# Patient Record
Sex: MALE | Race: WHITE | NOT HISPANIC OR LATINO | Employment: PART TIME | URBAN - METROPOLITAN AREA
[De-identification: names, ages, dates, MRNs, and addresses within clinical notes are randomized per-mention and may not be internally consistent; named-entity substitution may affect disease eponyms.]

---

## 2023-04-11 ENCOUNTER — TELEPHONE (OUTPATIENT)
Dept: PLASTIC SURGERY | Facility: CLINIC | Age: 58
End: 2023-04-11
Payer: COMMERCIAL

## 2023-04-11 NOTE — CONFIDENTIAL NOTE
[facility] :  Care Coordination Note     SITUATION   Yunior Holden (he/she) is a 57 year old adult who is receiving support for:  Care Team  .    BACKGROUND     Pt is scheduled for a minimal depth vaginoplasty consult with Dr. Valverde on 10/24/23.     Pt expressed wanting to meet sooner, so writer added her appointment to Dr. Valverde's cancellation waitlist.     ASSESSMENT     Surgery              CGC Assessment  [facility] [place] (P) Enrolled  Patient has a therapist: (P) Yes  Name of therapist: (P) Sondra France  Letter of support #1: (P) Requested  Letter of support #2: (P) Requested  Surgery being considered: (P) Yes  Vaginoplasty: (P) Yes    Pt reports:   No nicotine or other gender affirming surgeries  HRT 15 months      PLAN          Nursing Interventions:       Follow-up plan:  1. Obtain SEMAJ George

## 2023-04-11 NOTE — CONFIDENTIAL NOTE
Writer called re: follow up on pt voicemail requesting vaginoplasty consult with Dr. Valverde. Pt asked for a call back later in the day.     Writer called back two hours later and LVM.

## 2023-05-27 ENCOUNTER — HEALTH MAINTENANCE LETTER (OUTPATIENT)
Age: 58
End: 2023-05-27

## 2023-07-27 NOTE — TELEPHONE ENCOUNTER
FUTURE VISIT INFORMATION      FUTURE VISIT INFORMATION:  Date: 10/24/23  Time: 1:00pm  Location: [facility]  REFERRAL INFORMATION:  Reason for visit/diagnosis  minimal depth vaginoplasty     RECORDS REQUESTED FROM:       Clinic name Comments Records Status Imaging Status   [facility] Plastic surgery Ov/notes 7/27/23-8/16/19 Care Everywhere

## 2023-10-24 ENCOUNTER — PRE VISIT (OUTPATIENT)
Dept: UROLOGY | Facility: CLINIC | Age: 58
End: 2023-10-24

## 2023-10-24 ENCOUNTER — OFFICE VISIT (OUTPATIENT)
Dept: PLASTIC SURGERY | Facility: CLINIC | Age: 58
End: 2023-10-24
Payer: COMMERCIAL

## 2023-10-24 VITALS
OXYGEN SATURATION: 98 % | BODY MASS INDEX: 20.66 KG/M2 | DIASTOLIC BLOOD PRESSURE: 79 MMHG | SYSTOLIC BLOOD PRESSURE: 116 MMHG | WEIGHT: 155.9 LBS | HEART RATE: 71 BPM | HEIGHT: 73 IN

## 2023-10-24 DIAGNOSIS — F64.9 GENDER DYSPHORIA: Primary | ICD-10-CM

## 2023-10-24 PROBLEM — H04.123 DRY EYES: Status: ACTIVE | Noted: 2023-01-04

## 2023-10-24 PROBLEM — H11.001 PTERYGIUM OF RIGHT EYE: Status: ACTIVE | Noted: 2023-01-03

## 2023-10-24 PROBLEM — H15.101 EPISCLERITIS OF RIGHT EYE: Status: ACTIVE | Noted: 2023-01-04

## 2023-10-24 PROCEDURE — 99205 OFFICE O/P NEW HI 60 MIN: CPT | Performed by: UROLOGY

## 2023-10-24 RX ORDER — BUPROPION HYDROCHLORIDE 100 MG/1
200 TABLET, EXTENDED RELEASE ORAL DAILY
COMMUNITY

## 2023-10-24 RX ORDER — SPIRONOLACTONE 100 MG/1
100 TABLET, FILM COATED ORAL DAILY
COMMUNITY

## 2023-10-24 RX ORDER — ESTRADIOL 2 MG/1
TABLET ORAL
COMMUNITY

## 2023-10-24 RX ORDER — PROGESTERONE 100 MG/1
100 CAPSULE ORAL AT BEDTIME
COMMUNITY

## 2023-10-24 RX ORDER — IBUPROFEN 400 MG/1
400 TABLET, FILM COATED ORAL EVERY 6 HOURS PRN
COMMUNITY
Start: 2023-08-27

## 2023-10-24 RX ORDER — BENZONATATE 100 MG/1
CAPSULE ORAL
COMMUNITY
Start: 2023-08-27 | End: 2023-10-24

## 2023-10-24 RX ORDER — ROPINIROLE 0.5 MG/1
0.5 TABLET, FILM COATED ORAL DAILY PRN
COMMUNITY
Start: 2023-02-09

## 2023-10-24 RX ORDER — ACETAMINOPHEN 325 MG/1
650 TABLET ORAL EVERY 6 HOURS PRN
COMMUNITY
Start: 2023-08-27

## 2023-10-24 ASSESSMENT — PAIN SCALES - GENERAL: PAINLEVEL: NO PAIN (0)

## 2023-10-24 NOTE — PROGRESS NOTES
"    Name: Yunior Holden \"Sabas"    MRN: [id number]   YOB: 1965                 Chief Complaint:   Gender Dysphoria            History of Present Illness:   Ciara is a 58 year old transgender female seen in consultation for gender dysphoria    Patient transitioned starting in 2021  Preferred pronouns are: she/her/hers  The patient has been on exogenous hormones since: Nov 2021.  In terms of an intimate relationship, the patient lives with her spouse of 23 years, Kevin Holden.  In terms of fertility, the patient: has 5 kids age 8-23.No desire for more children.    (New)  The patient has obtained a letter of support from their therapist for top surgery and can obtain updated version for bottom surgery. They are planning to switch from [employer] [facility] to [facility], so would not need DA.    (Prior Surgery)  The patient has previously undergone no gender surgeries.     Long-term surgical goals for the patient include: minimal depth vaginoplasty and breast augmentations    The patient is here today expressing interest in minimal depth vaginoplasty.           Past Medical History:   No past medical history on file.  Gender dysphoria  Depression         Past Surgical History:   No past surgical history on file.  Right wrist surgery to repair tendon after injury         Social History:     Social History     Tobacco Use    Smoking status: Never    Smokeless tobacco: Never   Substance Use Topics    Alcohol use: Not on file            Family History:   No family history on file.           Allergies:   No Known Allergies         Medications:     Current Outpatient Medications   Medication Sig    acetaminophen (TYLENOL) 325 MG tablet     buPROPion (WELLBUTRIN SR) 100 MG 12 hr tablet Take 200 mg by mouth daily    estradiol (ESTRACE) 2 MG tablet Take 4 tablets (8 mg) by mouth daily.    ibuprofen (ADVIL/MOTRIN) 400 MG tablet     progesterone (PROMETRIUM) 100 MG capsule Take 100 mg by mouth at bedtime " "   rOPINIRole (REQUIP) 0.5 MG tablet Take 0.5 mg by mouth    spironolactone (ALDACTONE) 100 MG tablet Take 100 mg by mouth daily     No current facility-administered medications for this visit.             Review of Systems:    ROS: ROS neg other than the symptoms noted above in the HPI.          Physical Exam:   /79 (BP Location: Left arm, Patient Position: Sitting, Cuff Size: Adult Regular)   Pulse 71   Ht 1.842 m (6' 0.5\")   Wt 70.7 kg (155 lb 14.4 oz)   SpO2 98%   BMI 20.85 kg/m    General: age-appropriate in NAD  HEENT: Head AT/NC, EOMI, CN Grossly intact  Resp: no respiratory distress  :  phallus WNL  Testicles in place.  Scrotum WNL  Neuro: grossly intact  Motor: excellent strength throughout  Skin: clear of rashes or ecchymoses.          Outside records:    I spent 10 minutes reviewing outside records.         Assessment and Plan:   58 year old transgender female with gender dysphoria    The criteria for genital surgery are specific to the type of surgery being requested.  Criteria for bottom surgery:    1. Persistent, well documented gender dysphoria;  2. Capacity to make a fully informed decision and to consent for treatment;  3. Age of consent (>18 years old)  4. If significant medical or mental health concerns are present, they must be well controlled.  5. 12 continuous months of hormone therapy as appropriate to the patient s gender goals (unless  the patient has a medical contraindication or is otherwise unable or unwilling to take  Hormones).  6. Two letters of support    The aim of hormone therapy prior to gonadectomy is primarily to introduce a period of reversible  estrogen or testosterone suppression, before the patient undergoes irreversible surgical intervention.    I reviewed the steps of orchiectomy. I reviewed the surgical procedure. I reviewed the risks and benefits including bleeding, infection and irreversible nature of the procedure. The patient would like orchiectomy as part " of vaginoplasty.    Hair removal is a requirement prior to full depth vaginoplasty as the genital skin will be placed in the vaginal cavity. Lack of hair removal would lead to complications related to intravaginal hair. This is nearly impossible to remove postoperatively.    She has a persistent, well documented gender dysphoria. She has capacity to make a fully informed decision and to consent for treatment. Her mental health issues are well controlled. She has been on continuous hormones for years. She has one letter of support.     The patient meets all of these criteria. We discussed that gender affirmation surgery should be considered permanent. We discussed risks/complications of rectal injury, rectovaginal fistula, bleeding, fluid collection, infection, injury to surrounding structures, flap loss, sensory loss, wound dehiscence, vaginal prolapse, vaginal shrinkage/stenosis, need for lifelong dilation, urinary stream abnormalities, DVT/PE and need for revision surgery.     We discussed the option for minimal depth and full depth. She would like minimal depth vaginoplasty     We also discussed the need to stop hormones rodo-procedurally for 1 week before and after surgery.     We discussed that transgender vaginoplasty for this patient would include: penectomy, orchiectomy, clitoroplasty, labiaplasty, urethral reconstruction, creation of a minimal depth vagina, and scrotectomy.       Does not need hair removal  Needs to submit her LOS for review. She will be switching from [place] to [employer] insurance end of November, then will be ready for prior auth. She is planning to stay with [facility].      Plan: Patient to submit LOS for review and notify us when she is transitioned to new insurance. Then will submit PA for minimal depth vaginoplasty    F/U: Patient will contact us once she has new insurance in place. If she stays with [facility] will be ready for PA    Physician Attestation   I, Troy Valverde MD,  saw this patient and agree with the findings and plan of care as documented in the note.      Troy Valverde MD  Reconstructive Urology    60 minutes spent by me on the date of the encounter doing chart review, history and exam, documentation and further activities per the note

## 2023-10-24 NOTE — NURSING NOTE
"Chief Complaint   Patient presents with    Consult     Ciara is being seen today for a consult regarding MD vaginoplasty.       Vitals:    10/24/23 1259   BP: 116/79   BP Location: Left arm   Patient Position: Sitting   Cuff Size: Adult Regular   Pulse: 71   SpO2: 98%   Weight: 70.7 kg (155 lb 14.4 oz)   Height: 1.842 m (6' 0.5\")       Body mass index is 20.85 kg/m .    Rashaun Rankin, EMT    "

## 2023-11-29 ENCOUNTER — TELEPHONE (OUTPATIENT)
Dept: PLASTIC SURGERY | Facility: CLINIC | Age: 58
End: 2023-11-29
Payer: COMMERCIAL

## 2023-11-29 NOTE — CONFIDENTIAL NOTE
Pt called to see if we received LOS faxes by two providers in last few weeks. These are not in her chart. Pt had the wrong fax number, so writer gave her the right one. Writer encouraged pt to upload these via her mychart, but she does not have PDF copies. Mental health providers are in [facility], but writer cannot see LOS in Care Everywhere. Pt stated she would rather not acquire PDF copies of the letters, so writer requested providers refax them and writer will call her back with any other solutions to mychart issue.

## 2023-11-30 ENCOUNTER — TELEPHONE (OUTPATIENT)
Dept: PLASTIC SURGERY | Facility: CLINIC | Age: 58
End: 2023-11-30
Payer: COMMERCIAL

## 2023-11-30 NOTE — TELEPHONE ENCOUNTER
Pt left voicemail asking about how to get her letters of support from another health system into ours. Pt was under the impression that we would be able to see letters of support via CareEverywhere at [facility]. Explained that we are able to see some information at [facility] chart but not letters of support. Explained how to upload letter of support to [place] once pt had physical copy or PDF of letters. Offered emailing them to writer once acquired as well. Pt may opt to bring letters in person. Pt will let us know what she plans to do once letters are in hand.

## 2023-12-14 ENCOUNTER — DOCUMENTATION ONLY (OUTPATIENT)
Dept: PLASTIC SURGERY | Facility: CLINIC | Age: 58
End: 2023-12-14
Payer: COMMERCIAL

## 2023-12-14 NOTE — PROGRESS NOTES
[facility] :  Care Coordination Note     SITUATION   Ciara Holden is a 58 year old adult who is receiving support for:  Care Team  .    BACKGROUND     LOS and DA received and both meet criteria. Sent message to RNCC to request PA for minimal depth vaginoplasty.    ASSESSMENT     Surgery              CGC Assessment  [facility] [place] [facility] [place]  Patient has a therapist: Yes  Name of therapist: Sondra France  Letter of support #1: Received  Letter #1 Date: 11/21/23  Surgery being considered: Yes  Vaginoplasty: Yes      PLAN          Nursing Interventions:       Follow-up plan:  Surgeon will PA for minimal depth vaginoplasty..       JEIMY Prescott

## 2024-02-15 DIAGNOSIS — F64.9 GENDER DYSPHORIA: Primary | ICD-10-CM

## 2024-02-15 RX ORDER — CEFAZOLIN SODIUM 2 G/50ML
2 SOLUTION INTRAVENOUS SEE ADMIN INSTRUCTIONS
Status: CANCELLED | OUTPATIENT
Start: 2024-02-15

## 2024-02-15 RX ORDER — HEPARIN SODIUM 5000 [USP'U]/.5ML
5000 INJECTION, SOLUTION INTRAVENOUS; SUBCUTANEOUS
Status: CANCELLED | OUTPATIENT
Start: 2024-02-15

## 2024-02-15 RX ORDER — CEFAZOLIN SODIUM 2 G/50ML
2 SOLUTION INTRAVENOUS
Status: CANCELLED | OUTPATIENT
Start: 2024-02-15

## 2024-02-16 ENCOUNTER — HOSPITAL ENCOUNTER (INPATIENT)
Facility: CLINIC | Age: 59
Setting detail: SURGERY ADMIT
End: 2024-02-16
Attending: UROLOGY | Admitting: UROLOGY
Payer: COMMERCIAL

## 2024-02-16 ENCOUNTER — TELEPHONE (OUTPATIENT)
Dept: UROLOGY | Facility: CLINIC | Age: 59
End: 2024-02-16
Payer: COMMERCIAL

## 2024-02-16 NOTE — TELEPHONE ENCOUNTER
Patient is schedule for surgery with: Dr. Valverde    Surgery Date: 3/21     Location: [facility]    H&P: to be completed by Primary Care team - patient instructed to schedule per patient, this will be scheduled with [facility]     Post-op: 2 wk post op in plastics. Clinic is full, please assist with scheduling    Patient will receive a phone call from pre-admission nurses 1-2 days prior to surgery with arrival time and NPO instructions.    Patient aware times are subject to change up until day before surgery.     Patient questions/concerns: N/A     Surgery packet to be sent via US mail with sridevi Garzon on 2/16/2024 at 9:30 AM

## 2024-03-06 ENCOUNTER — TELEPHONE (OUTPATIENT)
Dept: PLASTIC SURGERY | Facility: CLINIC | Age: 59
End: 2024-03-06

## 2024-03-06 NOTE — TELEPHONE ENCOUNTER
Per Milana Montero, patient insurance has changed. Procedure on 3/21 has been cancelled. Awaiting new prior auth. Dannielle Garzon on 3/6/2024 at 12:36 PM

## 2024-03-06 NOTE — TELEPHONE ENCOUNTER
Returned pt's phone call. She was informed earlier this week that her insurance coverage [employer] [place] is no longer active. She has surgery with Dr. Valverde on 3/21/24. Unable to reach, left voicemail with reason for calling and provided a call back number.    Pt called back. Pt states that the MA will be active again starting April 1st. She will have [facility] again, but is changing [facility] to [facility]. Explained that if the insurance plan is different, we will need to submit PA again to new insurance. Pt verbalized an understanding of this. She will send us the information about her new insurance plan once she received it. Then, we will see if new PA is needed. Pt has no other questions at this time. Surgery scheduler informed that 3/21/24 surgery should be cancelled.

## 2024-03-06 NOTE — TELEPHONE ENCOUNTER
[facility] Call Center    Phone Message    May a detailed message be left on voicemail: yes     Reason for Call: Other: Patient calling to reschedule some appointments. Please call patient to discuss.      Action Taken: Message routed to:  [facility]ER CARE    Travel Screening: Not Applicable

## 2024-04-02 ENCOUNTER — HOSPITAL ENCOUNTER (INPATIENT)
Facility: CLINIC | Age: 59
Setting detail: SURGERY ADMIT
End: 2024-04-02
Attending: UROLOGY | Admitting: UROLOGY
Payer: COMMERCIAL

## 2024-04-02 NOTE — TELEPHONE ENCOUNTER
Spoke with the patient and was able to confirm all scheduled information.     Patient is schedule for surgery with: Dr. Valverde    Surgery Date: 7/8     Location: [facility]    H&P: to be completed by Primary Care team - patient instructed to schedule per patient, this will be scheduled with [facility]     Post-op:  7/23, in-person visit, with Dr. Valverde in plastics    Patient will receive a phone call from pre-admission nurses 1-2 days prior to surgery with arrival time and NPO instructions.    Patient aware times are subject to change up until day before surgery.     Patient questions/concerns: N/A     Surgery packet sent previously       Dannielle Garzon on 4/2/2024 at 10:36 AM

## 2024-04-15 NOTE — TELEPHONE ENCOUNTER
Patient called to offer a sooner appt. Patient was agreeable to change.  Spoke with the patient and was able to confirm all scheduled information.     Patient is schedule for surgery with: Dr. Valverde    Surgery Date: 6/28     Location: [facility]    H&P: to be completed by Primary Care team - patient instructed to schedule per patient, this will be scheduled with [facility]     Post-op: 7/9 - 2 wk po in plastic with Dr. Valverde.... 8/9 - 6 wk po in plastic with Barbara    Patient will receive a phone call from pre-admission nurses 1-2 days prior to surgery with arrival time and NPO instructions.    Patient aware times are subject to change up until day before surgery.     Patient questions/concerns: N/A     Surgery packet sent previously Did send out updates map and Your Surgery Day      Dannielle Garzon on 4/15/2024 at 2:31 PM

## 2024-05-29 ENCOUNTER — MYC MEDICAL ADVICE (OUTPATIENT)
Dept: PLASTIC SURGERY | Facility: CLINIC | Age: 59
End: 2024-05-29
Payer: COMMERCIAL

## 2024-06-05 ENCOUNTER — TELEPHONE (OUTPATIENT)
Dept: PLASTIC SURGERY | Facility: CLINIC | Age: 59
End: 2024-06-05
Payer: COMMERCIAL

## 2024-06-05 NOTE — TELEPHONE ENCOUNTER
Pre and Post Op Patient Education                                       Diagnosis: gender dysphoria  Teaching pre and post op for: minimal depth vaginoplasty  Person involved in teaching: patient    Patient demonstrates an understanding of the following:  - Date of surgery:  6/28/24 - Friday  - Surgery time: 12:10 pm (pt understands that this time could change)  - Location of surgery: [facility] - [address] [place], [place] [id number]     - Pre-operative history physical: [facility] 6/3/24      - Post-op follow-up:   7/8/24 at 12:30 pm with Barbara Frey, NP  8/13/24 at 1:20 pm with Dr. Valverde      Patient verbalizes an understanding of the following:  - The need for a responsible adult  and someone to stay with them for the first 24 hours post-operatively: Yes  - NPO per anesthesia guidelines: Yes  - Pre-op showering x2 with Hibiclens/chlorhexidine soap: Yes  - The need to stop/discontinue all hormones 1 weeks before surgery and may restart upon return home after surgery: Yes, pt will stop PO estrogen and estradiol on 6/21/24. She may resume hormones upon returning home after surgery. Advised pt that spironolactone does not need to be restarted after surgery and that she should schedule an appointment with hormone prescriber 1-3 months after surgery for hormone level check.      Discussed   - Pain management after surgery  - Infection prevention and hand hygiene  - Surgical procedure site care taught  - Signs and symptoms of infection  - Wound care and will be taught at the time of discharge  - Reviewed Minimal Depth Vaginoplasty: Pre and Post Operative Instructions packet in full  - Information about how to contact the hospital, nurse, and clinic if needed    Postoperative Plans:  Pt lives at home with her wife and 3 children (youngest will be 9 soon). She feels well supported and will have adequate help with things she cannot do, such as transportation, grocery shopping,  and household chores, among others. 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 She has plenty of time to recover before work obligations resume.      Surgical instructions given to patient via phone.    Total time with patient: 65 minutes    Winston Donaldson RN

## 2024-06-19 ENCOUNTER — ANESTHESIA EVENT (OUTPATIENT)
Dept: SURGERY | Facility: CLINIC | Age: 59
DRG: 876 | End: 2024-06-19
Payer: COMMERCIAL

## 2024-06-28 ENCOUNTER — ANESTHESIA (OUTPATIENT)
Dept: SURGERY | Facility: CLINIC | Age: 59
DRG: 876 | End: 2024-06-28
Payer: COMMERCIAL

## 2024-06-28 ENCOUNTER — HOSPITAL ENCOUNTER (INPATIENT)
Facility: CLINIC | Age: 59
LOS: 2 days | Discharge: HOME | DRG: 876 | End: 2024-06-30
Attending: UROLOGY | Admitting: UROLOGY
Payer: COMMERCIAL

## 2024-06-28 DIAGNOSIS — F64.9 GENDER DYSPHORIA: Primary | ICD-10-CM

## 2024-06-28 LAB
ABO/RH(D): NORMAL
ANTIBODY SCREEN: NEGATIVE
GLUCOSE BLDC GLUCOMTR-MCNC: 113 MG/DL (ref 70–99)
SPECIMEN EXPIRATION DATE: NORMAL

## 2024-06-28 PROCEDURE — 360N000078 HC SURGERY LEVEL 5, PER MIN: Performed by: UROLOGY

## 2024-06-28 PROCEDURE — 54125 REMOVAL OF PENIS: CPT | Mod: KX | Performed by: UROLOGY

## 2024-06-28 PROCEDURE — 272N000001 HC OR GENERAL SUPPLY STERILE: Performed by: UROLOGY

## 2024-06-28 PROCEDURE — 0W4M070 CREATION OF VAGINA IN MALE PERINEUM WITH AUTOLOGOUS TISSUE SUBSTITUTE, OPEN APPROACH: ICD-10-PCS | Performed by: UROLOGY

## 2024-06-28 PROCEDURE — 258N000003 HC RX IP 258 OP 636: Performed by: STUDENT IN AN ORGANIZED HEALTH CARE EDUCATION/TRAINING PROGRAM

## 2024-06-28 PROCEDURE — 14301 TIS TRNFR ANY 30.1-60 SQ CM: CPT | Mod: KX | Performed by: UROLOGY

## 2024-06-28 PROCEDURE — 56805 CLITOROPLASTY INTERSEX STATE: CPT | Mod: KX | Performed by: UROLOGY

## 2024-06-28 PROCEDURE — 250N000011 HC RX IP 250 OP 636: Performed by: STUDENT IN AN ORGANIZED HEALTH CARE EDUCATION/TRAINING PROGRAM

## 2024-06-28 PROCEDURE — 250N000011 HC RX IP 250 OP 636: Performed by: NURSE ANESTHETIST, CERTIFIED REGISTERED

## 2024-06-28 PROCEDURE — 999N000141 HC STATISTIC PRE-PROCEDURE NURSING ASSESSMENT: Performed by: UROLOGY

## 2024-06-28 PROCEDURE — 250N000013 HC RX MED GY IP 250 OP 250 PS 637: Performed by: STUDENT IN AN ORGANIZED HEALTH CARE EDUCATION/TRAINING PROGRAM

## 2024-06-28 PROCEDURE — 54520 REMOVAL OF TESTIS: CPT | Mod: 50 | Performed by: UROLOGY

## 2024-06-28 PROCEDURE — 250N000011 HC RX IP 250 OP 636: Performed by: UROLOGY

## 2024-06-28 PROCEDURE — 88302 TISSUE EXAM BY PATHOLOGIST: CPT | Mod: TC | Performed by: UROLOGY

## 2024-06-28 PROCEDURE — 53410 RECONSTRUCTION OF URETHRA: CPT | Mod: KX | Performed by: UROLOGY

## 2024-06-28 PROCEDURE — 56805 CLITOROPLASTY INTERSEX STATE: CPT | Performed by: NURSE ANESTHETIST, CERTIFIED REGISTERED

## 2024-06-28 PROCEDURE — 999N000128 HC STATISTIC PERIPHERAL IV START W/O US GUIDANCE

## 2024-06-28 PROCEDURE — 258N000003 HC RX IP 258 OP 636: Performed by: NURSE ANESTHETIST, CERTIFIED REGISTERED

## 2024-06-28 PROCEDURE — 55150 REMOVAL OF SCROTUM: CPT | Mod: KX | Performed by: UROLOGY

## 2024-06-28 PROCEDURE — 250N000009 HC RX 250: Performed by: NURSE ANESTHETIST, CERTIFIED REGISTERED

## 2024-06-28 PROCEDURE — 86900 BLOOD TYPING SEROLOGIC ABO: CPT | Performed by: ANESTHESIOLOGY

## 2024-06-28 PROCEDURE — 710N000010 HC RECOVERY PHASE 1, LEVEL 2, PER MIN: Performed by: UROLOGY

## 2024-06-28 PROCEDURE — 57292 CONSTRUCT VAGINA WITH GRAFT: CPT | Mod: KX | Performed by: UROLOGY

## 2024-06-28 PROCEDURE — 88305 TISSUE EXAM BY PATHOLOGIST: CPT | Mod: 26 | Performed by: PATHOLOGY

## 2024-06-28 PROCEDURE — 250N000009 HC RX 250: Performed by: ANESTHESIOLOGY

## 2024-06-28 PROCEDURE — 120N000002 HC R&B MED SURG/OB UMMC

## 2024-06-28 PROCEDURE — 250N000025 HC SEVOFLURANE, PER MIN: Performed by: UROLOGY

## 2024-06-28 PROCEDURE — 370N000017 HC ANESTHESIA TECHNICAL FEE, PER MIN: Performed by: UROLOGY

## 2024-06-28 PROCEDURE — 250N000009 HC RX 250: Performed by: UROLOGY

## 2024-06-28 PROCEDURE — 56805 CLITOROPLASTY INTERSEX STATE: CPT | Performed by: ANESTHESIOLOGY

## 2024-06-28 RX ORDER — HEPARIN SODIUM 5000 [USP'U]/.5ML
5000 INJECTION, SOLUTION INTRAVENOUS; SUBCUTANEOUS
Status: COMPLETED | OUTPATIENT
Start: 2024-06-28 | End: 2024-06-28

## 2024-06-28 RX ORDER — METHOCARBAMOL 750 MG/1
750 TABLET, FILM COATED ORAL EVERY 6 HOURS PRN
Status: DISCONTINUED | OUTPATIENT
Start: 2024-06-28 | End: 2024-06-30 | Stop reason: HOSPADM

## 2024-06-28 RX ORDER — NALOXONE HYDROCHLORIDE 0.4 MG/ML
0.4 INJECTION, SOLUTION INTRAMUSCULAR; INTRAVENOUS; SUBCUTANEOUS
Status: DISCONTINUED | OUTPATIENT
Start: 2024-06-28 | End: 2024-06-30 | Stop reason: HOSPADM

## 2024-06-28 RX ORDER — FENTANYL CITRATE 50 UG/ML
50 INJECTION, SOLUTION INTRAMUSCULAR; INTRAVENOUS EVERY 5 MIN PRN
Status: DISCONTINUED | OUTPATIENT
Start: 2024-06-28 | End: 2024-06-28 | Stop reason: HOSPADM

## 2024-06-28 RX ORDER — BUPIVACAINE HYDROCHLORIDE AND EPINEPHRINE 2.5; 5 MG/ML; UG/ML
INJECTION, SOLUTION EPIDURAL; INFILTRATION; INTRACAUDAL; PERINEURAL PRN
Status: DISCONTINUED | OUTPATIENT
Start: 2024-06-28 | End: 2024-06-28 | Stop reason: HOSPADM

## 2024-06-28 RX ORDER — NALOXONE HYDROCHLORIDE 0.4 MG/ML
0.2 INJECTION, SOLUTION INTRAMUSCULAR; INTRAVENOUS; SUBCUTANEOUS
Status: DISCONTINUED | OUTPATIENT
Start: 2024-06-28 | End: 2024-06-30 | Stop reason: HOSPADM

## 2024-06-28 RX ORDER — OXYCODONE HYDROCHLORIDE 5 MG/1
5 TABLET ORAL EVERY 4 HOURS PRN
Status: DISCONTINUED | OUTPATIENT
Start: 2024-06-28 | End: 2024-06-30 | Stop reason: HOSPADM

## 2024-06-28 RX ORDER — DEXAMETHASONE SODIUM PHOSPHATE 4 MG/ML
4 INJECTION, SOLUTION INTRA-ARTICULAR; INTRALESIONAL; INTRAMUSCULAR; INTRAVENOUS; SOFT TISSUE
Status: DISCONTINUED | OUTPATIENT
Start: 2024-06-28 | End: 2024-06-28 | Stop reason: HOSPADM

## 2024-06-28 RX ORDER — FENTANYL CITRATE 50 UG/ML
INJECTION, SOLUTION INTRAMUSCULAR; INTRAVENOUS PRN
Status: DISCONTINUED | OUTPATIENT
Start: 2024-06-28 | End: 2024-06-28

## 2024-06-28 RX ORDER — HYDROMORPHONE HYDROCHLORIDE 1 MG/ML
0.4 INJECTION, SOLUTION INTRAMUSCULAR; INTRAVENOUS; SUBCUTANEOUS
Status: DISCONTINUED | OUTPATIENT
Start: 2024-06-28 | End: 2024-06-30 | Stop reason: HOSPADM

## 2024-06-28 RX ORDER — HYDROMORPHONE HCL IN WATER/PF 6 MG/30 ML
0.4 PATIENT CONTROLLED ANALGESIA SYRINGE INTRAVENOUS EVERY 5 MIN PRN
Status: DISCONTINUED | OUTPATIENT
Start: 2024-06-28 | End: 2024-06-28 | Stop reason: HOSPADM

## 2024-06-28 RX ORDER — LIDOCAINE 40 MG/G
CREAM TOPICAL
Status: DISCONTINUED | OUTPATIENT
Start: 2024-06-28 | End: 2024-06-30 | Stop reason: HOSPADM

## 2024-06-28 RX ORDER — CALCIUM CARBONATE 500 MG/1
500 TABLET, CHEWABLE ORAL 4 TIMES DAILY PRN
Status: DISCONTINUED | OUTPATIENT
Start: 2024-06-28 | End: 2024-06-30 | Stop reason: HOSPADM

## 2024-06-28 RX ORDER — PROPOFOL 10 MG/ML
INJECTION, EMULSION INTRAVENOUS PRN
Status: DISCONTINUED | OUTPATIENT
Start: 2024-06-28 | End: 2024-06-28

## 2024-06-28 RX ORDER — HYDROMORPHONE HCL IN WATER/PF 6 MG/30 ML
0.2 PATIENT CONTROLLED ANALGESIA SYRINGE INTRAVENOUS EVERY 5 MIN PRN
Status: DISCONTINUED | OUTPATIENT
Start: 2024-06-28 | End: 2024-06-28 | Stop reason: HOSPADM

## 2024-06-28 RX ORDER — CEFAZOLIN SODIUM 1 G/3ML
1 INJECTION, POWDER, FOR SOLUTION INTRAMUSCULAR; INTRAVENOUS EVERY 8 HOURS
Status: DISCONTINUED | OUTPATIENT
Start: 2024-06-28 | End: 2024-06-30 | Stop reason: HOSPADM

## 2024-06-28 RX ORDER — ONDANSETRON 2 MG/ML
INJECTION INTRAMUSCULAR; INTRAVENOUS PRN
Status: DISCONTINUED | OUTPATIENT
Start: 2024-06-28 | End: 2024-06-28

## 2024-06-28 RX ORDER — FENTANYL CITRATE 50 UG/ML
25 INJECTION, SOLUTION INTRAMUSCULAR; INTRAVENOUS EVERY 5 MIN PRN
Status: DISCONTINUED | OUTPATIENT
Start: 2024-06-28 | End: 2024-06-28 | Stop reason: HOSPADM

## 2024-06-28 RX ORDER — NALOXONE HYDROCHLORIDE 0.4 MG/ML
0.1 INJECTION, SOLUTION INTRAMUSCULAR; INTRAVENOUS; SUBCUTANEOUS
Status: DISCONTINUED | OUTPATIENT
Start: 2024-06-28 | End: 2024-06-28

## 2024-06-28 RX ORDER — ACETAMINOPHEN 325 MG/1
650 TABLET ORAL EVERY 4 HOURS PRN
Status: DISCONTINUED | OUTPATIENT
Start: 2024-07-01 | End: 2024-06-30 | Stop reason: HOSPADM

## 2024-06-28 RX ORDER — OXYCODONE HYDROCHLORIDE 10 MG/1
10 TABLET ORAL
Status: DISCONTINUED | OUTPATIENT
Start: 2024-06-28 | End: 2024-06-28

## 2024-06-28 RX ORDER — LIDOCAINE HYDROCHLORIDE 20 MG/ML
INJECTION, SOLUTION INFILTRATION; PERINEURAL PRN
Status: DISCONTINUED | OUTPATIENT
Start: 2024-06-28 | End: 2024-06-28

## 2024-06-28 RX ORDER — BISACODYL 10 MG
10 SUPPOSITORY, RECTAL RECTAL DAILY PRN
Status: DISCONTINUED | OUTPATIENT
Start: 2024-07-01 | End: 2024-06-30 | Stop reason: HOSPADM

## 2024-06-28 RX ORDER — POLYETHYLENE GLYCOL 3350 17 G/17G
17 POWDER, FOR SOLUTION ORAL DAILY
Status: DISCONTINUED | OUTPATIENT
Start: 2024-06-29 | End: 2024-06-30 | Stop reason: HOSPADM

## 2024-06-28 RX ORDER — SIMETHICONE 80 MG
80 TABLET,CHEWABLE ORAL EVERY 6 HOURS PRN
Status: DISCONTINUED | OUTPATIENT
Start: 2024-06-28 | End: 2024-06-30 | Stop reason: HOSPADM

## 2024-06-28 RX ORDER — CEFAZOLIN SODIUM/WATER 2 G/20 ML
2 SYRINGE (ML) INTRAVENOUS SEE ADMIN INSTRUCTIONS
Status: DISCONTINUED | OUTPATIENT
Start: 2024-06-28 | End: 2024-06-28 | Stop reason: HOSPADM

## 2024-06-28 RX ORDER — ACETAMINOPHEN 325 MG/1
975 TABLET ORAL EVERY 8 HOURS
Status: DISCONTINUED | OUTPATIENT
Start: 2024-06-28 | End: 2024-06-30 | Stop reason: HOSPADM

## 2024-06-28 RX ORDER — ONDANSETRON 2 MG/ML
4 INJECTION INTRAMUSCULAR; INTRAVENOUS EVERY 30 MIN PRN
Status: DISCONTINUED | OUTPATIENT
Start: 2024-06-28 | End: 2024-06-28 | Stop reason: HOSPADM

## 2024-06-28 RX ORDER — DEXAMETHASONE SODIUM PHOSPHATE 4 MG/ML
4 INJECTION, SOLUTION INTRA-ARTICULAR; INTRALESIONAL; INTRAMUSCULAR; INTRAVENOUS; SOFT TISSUE
Status: DISCONTINUED | OUTPATIENT
Start: 2024-06-28 | End: 2024-06-28

## 2024-06-28 RX ORDER — NALOXONE HYDROCHLORIDE 0.4 MG/ML
0.1 INJECTION, SOLUTION INTRAMUSCULAR; INTRAVENOUS; SUBCUTANEOUS
Status: DISCONTINUED | OUTPATIENT
Start: 2024-06-28 | End: 2024-06-28 | Stop reason: HOSPADM

## 2024-06-28 RX ORDER — ROPINIROLE 0.5 MG/1
0.5 TABLET, FILM COATED ORAL DAILY PRN
Status: DISCONTINUED | OUTPATIENT
Start: 2024-06-28 | End: 2024-06-30 | Stop reason: HOSPADM

## 2024-06-28 RX ORDER — PROCHLORPERAZINE MALEATE 10 MG
10 TABLET ORAL EVERY 6 HOURS PRN
Status: DISCONTINUED | OUTPATIENT
Start: 2024-06-28 | End: 2024-06-30 | Stop reason: HOSPADM

## 2024-06-28 RX ORDER — GLYCOPYRROLATE 0.2 MG/ML
INJECTION, SOLUTION INTRAMUSCULAR; INTRAVENOUS PRN
Status: DISCONTINUED | OUTPATIENT
Start: 2024-06-28 | End: 2024-06-28

## 2024-06-28 RX ORDER — TOLTERODINE 4 MG/1
4 CAPSULE, EXTENDED RELEASE ORAL DAILY
Status: COMPLETED | OUTPATIENT
Start: 2024-06-28 | End: 2024-06-28

## 2024-06-28 RX ORDER — SODIUM CHLORIDE 9 MG/ML
INJECTION, SOLUTION INTRAVENOUS CONTINUOUS
Status: DISCONTINUED | OUTPATIENT
Start: 2024-06-28 | End: 2024-06-30

## 2024-06-28 RX ORDER — SODIUM CHLORIDE, SODIUM LACTATE, POTASSIUM CHLORIDE, CALCIUM CHLORIDE 600; 310; 30; 20 MG/100ML; MG/100ML; MG/100ML; MG/100ML
INJECTION, SOLUTION INTRAVENOUS CONTINUOUS PRN
Status: DISCONTINUED | OUTPATIENT
Start: 2024-06-28 | End: 2024-06-28

## 2024-06-28 RX ORDER — OXYCODONE HYDROCHLORIDE 10 MG/1
10 TABLET ORAL EVERY 4 HOURS PRN
Status: DISCONTINUED | OUTPATIENT
Start: 2024-06-28 | End: 2024-06-30 | Stop reason: HOSPADM

## 2024-06-28 RX ORDER — SCOLOPAMINE TRANSDERMAL SYSTEM 1 MG/1
1 PATCH, EXTENDED RELEASE TRANSDERMAL
Status: DISCONTINUED | OUTPATIENT
Start: 2024-06-28 | End: 2024-06-30 | Stop reason: HOSPADM

## 2024-06-28 RX ORDER — ONDANSETRON 4 MG/1
4 TABLET, ORALLY DISINTEGRATING ORAL EVERY 30 MIN PRN
Status: DISCONTINUED | OUTPATIENT
Start: 2024-06-28 | End: 2024-06-28

## 2024-06-28 RX ORDER — HYDROXYZINE HYDROCHLORIDE 25 MG/1
25 TABLET, FILM COATED ORAL EVERY 6 HOURS PRN
Status: DISCONTINUED | OUTPATIENT
Start: 2024-06-28 | End: 2024-06-30 | Stop reason: HOSPADM

## 2024-06-28 RX ORDER — CEFAZOLIN SODIUM/WATER 2 G/20 ML
2 SYRINGE (ML) INTRAVENOUS
Status: COMPLETED | OUTPATIENT
Start: 2024-06-28 | End: 2024-06-28

## 2024-06-28 RX ORDER — SODIUM CHLORIDE, SODIUM LACTATE, POTASSIUM CHLORIDE, CALCIUM CHLORIDE 600; 310; 30; 20 MG/100ML; MG/100ML; MG/100ML; MG/100ML
INJECTION, SOLUTION INTRAVENOUS CONTINUOUS
Status: DISCONTINUED | OUTPATIENT
Start: 2024-06-28 | End: 2024-06-28 | Stop reason: HOSPADM

## 2024-06-28 RX ORDER — ONDANSETRON 2 MG/ML
4 INJECTION INTRAMUSCULAR; INTRAVENOUS EVERY 30 MIN PRN
Status: DISCONTINUED | OUTPATIENT
Start: 2024-06-28 | End: 2024-06-28

## 2024-06-28 RX ORDER — OXYCODONE HYDROCHLORIDE 5 MG/1
5 TABLET ORAL
Status: DISCONTINUED | OUTPATIENT
Start: 2024-06-28 | End: 2024-06-28

## 2024-06-28 RX ORDER — ONDANSETRON 2 MG/ML
4 INJECTION INTRAMUSCULAR; INTRAVENOUS EVERY 6 HOURS PRN
Status: DISCONTINUED | OUTPATIENT
Start: 2024-06-28 | End: 2024-06-30 | Stop reason: HOSPADM

## 2024-06-28 RX ORDER — HYDROMORPHONE HYDROCHLORIDE 1 MG/ML
0.2 INJECTION, SOLUTION INTRAMUSCULAR; INTRAVENOUS; SUBCUTANEOUS
Status: DISCONTINUED | OUTPATIENT
Start: 2024-06-28 | End: 2024-06-30 | Stop reason: HOSPADM

## 2024-06-28 RX ORDER — AMOXICILLIN 250 MG
1 CAPSULE ORAL 2 TIMES DAILY
Status: DISCONTINUED | OUTPATIENT
Start: 2024-06-28 | End: 2024-06-30 | Stop reason: HOSPADM

## 2024-06-28 RX ORDER — ONDANSETRON 4 MG/1
4 TABLET, ORALLY DISINTEGRATING ORAL EVERY 6 HOURS PRN
Status: DISCONTINUED | OUTPATIENT
Start: 2024-06-28 | End: 2024-06-30 | Stop reason: HOSPADM

## 2024-06-28 RX ORDER — ONDANSETRON 4 MG/1
4 TABLET, ORALLY DISINTEGRATING ORAL EVERY 30 MIN PRN
Status: DISCONTINUED | OUTPATIENT
Start: 2024-06-28 | End: 2024-06-28 | Stop reason: HOSPADM

## 2024-06-28 RX ORDER — BUPROPION HYDROCHLORIDE 200 MG/1
200 TABLET, EXTENDED RELEASE ORAL EVERY EVENING
Status: DISCONTINUED | OUTPATIENT
Start: 2024-06-28 | End: 2024-06-30 | Stop reason: HOSPADM

## 2024-06-28 RX ADMIN — Medication 2 G: at 12:31

## 2024-06-28 RX ADMIN — CEFAZOLIN 1 G: 1 INJECTION, POWDER, FOR SOLUTION INTRAMUSCULAR; INTRAVENOUS at 20:51

## 2024-06-28 RX ADMIN — GLYCOPYRROLATE 0.2 MG: 0.2 INJECTION, SOLUTION INTRAMUSCULAR; INTRAVENOUS at 13:06

## 2024-06-28 RX ADMIN — SODIUM CHLORIDE, POTASSIUM CHLORIDE, SODIUM LACTATE AND CALCIUM CHLORIDE: 600; 310; 30; 20 INJECTION, SOLUTION INTRAVENOUS at 12:14

## 2024-06-28 RX ADMIN — Medication 200 MG: at 15:32

## 2024-06-28 RX ADMIN — LIDOCAINE HYDROCHLORIDE 100 MG: 20 INJECTION, SOLUTION INFILTRATION; PERINEURAL at 12:23

## 2024-06-28 RX ADMIN — FENTANYL CITRATE 50 MCG: 50 INJECTION, SOLUTION INTRAMUSCULAR; INTRAVENOUS at 16:23

## 2024-06-28 RX ADMIN — FENTANYL CITRATE 100 MCG: 50 INJECTION INTRAMUSCULAR; INTRAVENOUS at 13:36

## 2024-06-28 RX ADMIN — TOLTERODINE 4 MG: 4 CAPSULE, EXTENDED RELEASE ORAL at 20:50

## 2024-06-28 RX ADMIN — MIDAZOLAM 2 MG: 1 INJECTION INTRAMUSCULAR; INTRAVENOUS at 12:14

## 2024-06-28 RX ADMIN — ACETAMINOPHEN 975 MG: 325 TABLET, FILM COATED ORAL at 20:50

## 2024-06-28 RX ADMIN — SCOPALAMINE 1 PATCH: 1 PATCH, EXTENDED RELEASE TRANSDERMAL at 12:05

## 2024-06-28 RX ADMIN — SODIUM CHLORIDE, POTASSIUM CHLORIDE, SODIUM LACTATE AND CALCIUM CHLORIDE: 600; 310; 30; 20 INJECTION, SOLUTION INTRAVENOUS at 14:18

## 2024-06-28 RX ADMIN — Medication 20 MG: at 13:37

## 2024-06-28 RX ADMIN — HYDROMORPHONE HYDROCHLORIDE 0.4 MG: 0.2 INJECTION, SOLUTION INTRAMUSCULAR; INTRAVENOUS; SUBCUTANEOUS at 16:45

## 2024-06-28 RX ADMIN — HEPARIN SODIUM 5000 UNITS: 5000 INJECTION, SOLUTION INTRAVENOUS; SUBCUTANEOUS at 11:33

## 2024-06-28 RX ADMIN — HYDROMORPHONE HYDROCHLORIDE 0.5 MG: 1 INJECTION, SOLUTION INTRAMUSCULAR; INTRAVENOUS; SUBCUTANEOUS at 15:30

## 2024-06-28 RX ADMIN — Medication 20 MG: at 14:19

## 2024-06-28 RX ADMIN — ONDANSETRON 4 MG: 2 INJECTION INTRAMUSCULAR; INTRAVENOUS at 15:23

## 2024-06-28 RX ADMIN — Medication 50 MG: at 12:25

## 2024-06-28 RX ADMIN — Medication 1 LOZENGE: at 20:50

## 2024-06-28 RX ADMIN — DOCUSATE SODIUM 50 MG AND SENNOSIDES 8.6 MG 1 TABLET: 8.6; 5 TABLET, FILM COATED ORAL at 20:50

## 2024-06-28 RX ADMIN — FENTANYL CITRATE 100 MCG: 50 INJECTION INTRAMUSCULAR; INTRAVENOUS at 12:23

## 2024-06-28 RX ADMIN — PROPOFOL 100 MG: 10 INJECTION, EMULSION INTRAVENOUS at 12:24

## 2024-06-28 RX ADMIN — SODIUM CHLORIDE: 9 INJECTION, SOLUTION INTRAVENOUS at 16:21

## 2024-06-28 ASSESSMENT — ACTIVITIES OF DAILY LIVING (ADL)
ADLS_ACUITY_SCORE: 20
ADLS_ACUITY_SCORE: 33
ADLS_ACUITY_SCORE: 20

## 2024-06-28 ASSESSMENT — COPD QUESTIONNAIRES: COPD: 0

## 2024-06-28 ASSESSMENT — ENCOUNTER SYMPTOMS
DYSRHYTHMIAS: 0
SEIZURES: 0

## 2024-06-28 ASSESSMENT — LIFESTYLE VARIABLES: TOBACCO_USE: 0

## 2024-06-28 NOTE — ANESTHESIA CARE TRANSFER NOTE
Patient: Yunior Holden    Procedure: Procedure(s):  Minimal Depth Vaginoplasty       Diagnosis: Gender dysphoria [F64.9]  Diagnosis Additional Information: No value filed.    Anesthesia Type:   General     Note:    Oropharynx: oropharynx clear of all foreign objects  Level of Consciousness: drowsy  Oxygen Supplementation: face mask  Level of Supplemental Oxygen (L/min / FiO2): 6  Independent Airway: airway patency satisfactory and stable  Dentition: dentition unchanged  Vital Signs Stable: post-procedure vital signs reviewed and stable  Report to RN Given: handoff report given  Patient transferred to: PACU    Handoff Report: Identifed the Patient, Identified the Reponsible Provider, Reviewed the pertinent medical history, Discussed the surgical course, Reviewed Intra-OP anesthesia mangement and issues during anesthesia, Set expectations for post-procedure period and Allowed opportunity for questions and acknowledgement of understanding      Vitals:  Vitals Value Taken Time   /67 06/28/24 1551   Temp 36.6    Pulse 66 06/28/24 1600   Resp 17 06/28/24 1600   SpO2 99 % 06/28/24 1600   Vitals shown include unfiled device data.    Electronically Signed By: Sondra Mckeon CRNA, APRN CRNA  June 28, 2024  4:01 PM

## 2024-06-28 NOTE — OP NOTE
OPERATIVE REPORT  PREOPERATIVE DIAGNOSIS: Gender Dysphoria  POSTOPERATIVE DIAGNOSIS: Gender Dysphoria  DATE OF SURGERY: June 28, 2024   PROCEDURE:   Penile Inversion Minimal Depth Vaginoplasty which includes:  1. Clitoroplasty   2. Total Penectomy  3. Urethroplasty   4. Construction of Minimal Depth Artificial Vagina   5. Scrotectomy  6. Bilateral labiaplasty via adjacent tissue transfer of scrotal and mons skin flaps, size 12 x 5 cm on left and 12 x 5 cm on right.   7. Bilateral orchiectomy      SURGEON: Troy Valverde MD  RESIDENT: Mik Brar MD  ANESTHESIA: General  EBL: 250ml  DRAINS: 16 Fr Bueno, Small Packing in Vagina, over incisions and in clitoral nieves, bolster, right groin 10Fr LILIA drain   SPECIMEN: Penis/urethra/scrotum     INDICATIONS:    Ciara Holden is a 58 year old year old transgender female patient with gender dysphoria. She meets WPATH guidelines for gender affirming surgery. She elects for minimal depth vaginoplasty. The risks, benefits and alternatives of the multiple treatment options were discussed with them and they elected to proceed. Specifically, this included, but was not limited to: wound infection, anesthesia risks, blood clots, urethral stricture, inadequate vaginal depth, and rectal injury.     DESCRIPTION OF PROCEDURE: After informed consent was obtained and preoperative antibiotics were given, the patient was taken to the operating room, placed supine on the operating table. SCDs and preoperative subcutaneous heparin were utilized for VTE prophylaxis. General anesthesia was induced and they were intubated. The patient was placed in lithotomy. The genitalia and lower abdomen were prepped and draped in a sterile fashion. A timeout was performed.     We then made a circumcising skin incision with care to leave a mucosal collar beneath the glans. This was dissected down to Manuel's fascia. The penis was degloved, which left a circumferential penile skin tube.       We made a midline  scrotal incision, which was taken down to the level of the urethra and the penis was brought through this.  We performed bilateral orchiectomy. Left testicle was dissected to inguinal ring using cautery. Cord was split in half. Each half was tied with 0 silk suture ligature. Entire stump was also tied with 0 silk suture ligature. Same was repeated on right testicle. Both testicles were passed off as specimens. This completed bilateral orchiectomy.     We then turned our attention to the glans in order to perform a clitoroplasty. A W-shaped incision was marked along the glans to create the clitoris. Bovie cautery was used to delineate the lateral borders of the distal urethra. We then completely dissected the urethra from the corpus cavernosum using Metzenbaum scissors and electrocautery to the level of the pubic symphysis and then amputated the corporal bodies bilaterally at this level. The specimen was then passed off to pathology. We then dissected  along the under surface of the tunica albuginea - freeing the corporal spongy tissue using a combination of blunt dissection and electrocautery. This ensured that the neurovascular bundle was left unharmed. The neoclitoris was then shaped and sutured in a narrow tubular stricture and the mucosal collar was shaped to form a clitoral nieves. We then moved back to the corpus cavernosal stumps and closed these with running PDS suture.     A midline scrotal incision was designed to meet the previous scrotal incision. Midline dissection was used to identify the corpus spongiosum and the bulbospongiosus muscle. Dissection was continued around the bulbospongiosus muscle to identify the ischiocavernosal muscles. The bulbospongiosus muscle was sharply taken off the bulbar urethra and laterally, and left intact posteriorly. At this point, sharp dissection was continued along the base of the bulbar urethra. The central tendon was released, which allowed for urethral mobility.       Using sharp dissection a space was developed between the urethra and prostate anteriorly and the rectum posteriorly. We stopped after division of the central tendon and some infraprostatic dissection to allow for an internalized vaginal canal without significant depth.   The neurovascular bundle was then folded such that the neoclitoris was positioned on the pubis. The clitoris was tacked in position using absorbable suture.     We then turned our attention to completing the urethroplasty. The urethra was transected at the level of the distal bulbar urethra. The urethra was spatulated for about 4cm ventrally, allowing the dorsal aspect to be sewn to the neoclitoral and clitoral nieves skin dorsally using 3-0 Vicryl interrupted suture - thus creating a mucosal, lubricated inner vulvar vestibule.  The bulbar urethra was debulked and oversewn. The urethral spatulation ended at the distal bulbar urethra. The edges of the urethrotomy was oversewn to decrease bleeding and zachariah the mucosa.      We then turned our attention to creation of a midline opening superior to the penile skin tube. This midline opening would later become the opening for the clitoris and urethra. Based on this location, I marked out the areas of scrotum that would need to be resected. I then performed excision of the excess scrotal tissue. These were two separate segments.   A small portion of the vaginal tube was excised to create an appropriate sized penile skin flap for the small, minimal depth canal. The tip of the vaginal tube was closed using absorbable suture and sutured into place at the depth of our vaginal dissection with multiple absorbable interrupted sutures. We placed PDS sutures to tack the skin tube to its appropriate location.     We designed bilateral labia majora using anteriorly perfused scrotal and mons skin. These labial flaps were 12x5cm per side. These were fashioned to create a feminine appearing labia. They were advanced  posteriorly and sutured into place using 3-0 Vicryl and Monocryl sutures in two layers. Prior to the deep closure we placed bilateral 10f LILIA drains that ended in the mons subcutaneous space and sutured them to the skin using 3-0 nylon. However, the left drain was found to have come out and was fully removed. The drain site was closed with a 3-0 vicryl in an interrupted fashion.  The ventral urethra was then sutured to the posterior aspect of the superiorly placed opening to complete the urethroplasty using 4-0 Vicryl suture. There was excellent urethral mucosal eversion with pink mucosa evident. We also made incisions just through the skin bilaterally for labia minora. We then used PDS in an interrupted horizontal mattress fashion to create folds of labia minora and clitoral hooding. We then closed the skin incisions with 5-0 Monocryl in a running fashion.      Xeroform packing was placed in the vagina. All counts were correct at the end of the case. The clitoris was pink and viable at the end of the case.  The Blackman was intact and draining clear urine.  A pressure dressing was applied and sutured in place using Prolene sutures.  The patient was then extubated, awakened, and transferred to the postop area in stable condition.     Plan:  - admit with pathway  - ancef while in house  - PT (ordered)  - only x1 LILIA drain  - blackman to remain  - reveal POD#2-3    Mik Brar MD PGY3  Urology Resident    As attending surgeon, I, Troy Valverde MD, was scrubbed and present for the entire procedure.

## 2024-06-28 NOTE — BRIEF OP NOTE
[facility]    Brief Operative Note    Pre-operative diagnosis: Gender dysphoria [F64.9]  Post-operative diagnosis Same as pre-operative diagnosis    Procedure: Minimal Depth Vaginoplasty, N/A - Vulva    Surgeon: Surgeons and Role:     * Troy Valverde MD - Primary     * Mik Brar MD - Resident - Assisting  Anesthesia: General   Estimated Blood Loss: 250mL    Drains: One 10Fr LILIA drain in R groin, 16Fr urethral blackman   Specimens:   ID Type Source Tests Collected by Time Destination   1 : Bilateral Scrotum Tissue Scrotum SURGICAL PATHOLOGY EXAM Troy Valverde MD 6/28/2024  1:11 PM    2 : penis and urethra Tissue Penis SURGICAL PATHOLOGY EXAM Troy Valverde MD 6/28/2024  1:59 PM      Findings:   None.  Complications: None.  Implants: * No implants in log *    Plan:  - pathway

## 2024-06-28 NOTE — ANESTHESIA PREPROCEDURE EVALUATION
"Anesthesia Pre-Procedure Evaluation    Patient: Yunior Holden   MRN: [id number] : 1965        Procedure : Procedure(s):  Minimal Depth Vaginoplasty          History reviewed. No pertinent past medical history.   History reviewed. No pertinent surgical history.   No Known Allergies   Social History     Tobacco Use    Smoking status: Never    Smokeless tobacco: Never   Substance Use Topics    Alcohol use: Not on file      Wt Readings from Last 1 Encounters:   24 72.1 kg (158 lb 15.2 oz)        Anesthesia Evaluation            ROS/MED HX  ENT/Pulmonary:    (-) tobacco use, asthma and COPD   Neurologic:    (-) no seizures, no CVA and no TIA   Cardiovascular:    (-) hypertension, CAD, arrhythmias and stent   METS/Exercise Tolerance: >4 METS    Hematologic:       Musculoskeletal:       GI/Hepatic:    (-) GERD and liver disease   Renal/Genitourinary:    (-) renal disease   Endo:    (-) Type II DM and thyroid disease   Psychiatric/Substance Use:       Infectious Disease:       Malignancy:       Other:            Physical Exam    Airway        Mallampati: I   TM distance: > 3 FB   Neck ROM: full   Mouth opening: > 3 cm    Respiratory Devices and Support         Dental       (+) Minor Abnormalities - some fillings, tiny chips      Cardiovascular          Rhythm and rate: regular and normal     Pulmonary           breath sounds clear to auscultation   (-) no rhonchi and no rales        OUTSIDE LABS:  CBC: No results found for: \"WBC\", \"HGB\", \"HCT\", \"PLT\"  BMP:   Lab Results   Component Value Date     (H) 2024     COAGS: No results found for: \"PTT\", \"INR\", \"FIBR\"  POC: No results found for: \"BGM\", \"HCG\", \"HCGS\"  HEPATIC: No results found for: \"ALBUMIN\", \"PROTTOTAL\", \"ALT\", \"AST\", \"GGT\", \"ALKPHOS\", \"BILITOTAL\", \"BILIDIRECT\", \"GLORIA\"  OTHER: No results found for: \"PH\", \"LACT\", \"A1C\", \"SIDNEY\", \"PHOS\", \"MAG\", \"LIPASE\", \"AMYLASE\", \"TSH\", \"T4\", \"T3\", \"CRP\", \"SED\"    Anesthesia Plan    ASA Status:  1    NPO " Status:  NPO Appropriate    Anesthesia Type: General.     - Airway: ETT   Induction: Intravenous, Propofol.   Maintenance: Inhalation.        Consents    Anesthesia Plan(s) and associated risks, benefits, and realistic alternatives discussed. Questions answered and patient/representative(s) expressed understanding.     - Discussed:     - Discussed with:  Patient            Postoperative Care    Pain management: IV analgesics.   PONV prophylaxis: Dexamethasone or Solumedrol, Ondansetron (or other 5HT-3), Scopolamine patch     Comments:               Rhys Monreal MD    I have reviewed the pertinent notes and labs in the chart from the past 30 days and (re)examined the patient.  Any updates or changes from those notes are reflected in this note.

## 2024-06-28 NOTE — PHARMACY-ADMISSION MEDICATION HISTORY
Pharmacist Admission Medication History    Admission medication history is complete. The information provided in this note is only as accurate as the sources available at the time of the update.    Information Source(s):  Pre-op RN assessment, Dispense history (Surescripts), Care Everywhere      Medication History Completed By: Cherelle Chiang [facility] 6/28/2024 6:19 PM    Prior to Admission medications    Medication Sig Last Dose Taking? Auth Provider Long Term End Date   acetaminophen (TYLENOL) 325 MG tablet Take 650 mg by mouth every 6 hours as needed for mild pain or fever Unknown Yes Reported, Patient     buPROPion (WELLBUTRIN SR) 100 MG 12 hr tablet Take 200 mg by mouth daily 6/27/2024 at 2000 Yes Reported, Patient Yes    ibuprofen (ADVIL/MOTRIN) 400 MG tablet Take 400 mg by mouth every 6 hours as needed for mild pain, inflammatory pain or fever Unknown Yes Reported, Patient     estradiol (ESTRACE) 2 MG tablet Take 4 tablets (8 mg) by mouth daily. 6/23/2024  Reported, Patient Yes    progesterone (PROMETRIUM) 100 MG capsule Take 100 mg by mouth at bedtime 6/23/2024  Reported, Patient     rOPINIRole (REQUIP) 0.5 MG tablet Take 0.5 mg by mouth daily as needed 6/24/2024  Reported, Patient Yes    spironolactone (ALDACTONE) 100 MG tablet Take 100 mg by mouth daily 6/26/2024  Reported, Patient Yes

## 2024-06-28 NOTE — ANESTHESIA POSTPROCEDURE EVALUATION
Patient: Yunior Holden    Procedure: Procedure(s):  Minimal Depth Vaginoplasty       Anesthesia Type:  General    Note:  Disposition: Inpatient   Postop Pain Control: Uneventful            Sign Out: Well controlled pain   PONV: No   Neuro/Psych: Uneventful            Sign Out: Acceptable/Baseline neuro status   Airway/Respiratory: Uneventful            Sign Out: Acceptable/Baseline resp. status   CV/Hemodynamics: Uneventful            Sign Out: Acceptable CV status; No obvious hypovolemia; No obvious fluid overload   Other NRE: NONE   DID A NON-ROUTINE EVENT OCCUR? No           Last vitals:  Vitals Value Taken Time   /62 06/28/24 1700   Temp 36.3  C (97.3  F) 06/28/24 1600   Pulse 71 06/28/24 1706   Resp 10 06/28/24 1706   SpO2 94 % 06/28/24 1706   Vitals shown include unfiled device data.    Electronically Signed By: Afshan Vargas MD  June 28, 2024  5:06 PM

## 2024-06-28 NOTE — ANESTHESIA PROCEDURE NOTES
Airway       Patient location during procedure: OR       Procedure Start/Stop Times: 6/28/2024 12:29 PM  Staff -        CRNA: Sondra Mckeon APRN CRNA       Performed By: CRNA  Consent for Airway        Urgency: elective  Indications and Patient Condition       Indications for airway management: rodo-procedural       Induction type:intravenous       Mask difficulty assessment: 1 - vent by mask    Final Airway Details       Final airway type: endotracheal airway       Successful airway: ETT - single  Endotracheal Airway Details        ETT size (mm): 7.5       Cuffed: yes       Successful intubation technique: direct laryngoscopy       DL Blade Type: Lane 2       Grade View of Cords: 1       Adjucts: stylet       Position: Right       Measured from: lips       Secured at (cm): 23       Bite block used: None    Post intubation assessment        Placement verified by: capnometry, equal breath sounds and chest rise        Number of attempts at approach: 1       Number of other approaches attempted: 0       Secured with: tape       Ease of procedure: easy       Dentition: Unchanged    Medication(s) Administered   Medication Administration Time: 6/28/2024 12:29 PM

## 2024-06-29 ENCOUNTER — APPOINTMENT (OUTPATIENT)
Dept: PHYSICAL THERAPY | Facility: CLINIC | Age: 59
DRG: 876 | End: 2024-06-29
Attending: UROLOGY
Payer: COMMERCIAL

## 2024-06-29 LAB
ANION GAP SERPL CALCULATED.3IONS-SCNC: 13 MMOL/L (ref 7–15)
BUN SERPL-MCNC: 16.6 MG/DL (ref 8–23)
CALCIUM SERPL-MCNC: 8.1 MG/DL (ref 8.6–10)
CHLORIDE SERPL-SCNC: 100 MMOL/L (ref 98–107)
CREAT SERPL-MCNC: 1.12 MG/DL (ref 0.51–1.17)
DEPRECATED HCO3 PLAS-SCNC: 21 MMOL/L (ref 22–29)
EGFRCR SERPLBLD CKD-EPI 2021: 76 ML/MIN/1.73M2
ERYTHROCYTE [DISTWIDTH] IN BLOOD BY AUTOMATED COUNT: 11.9 % (ref 10–15)
GLUCOSE SERPL-MCNC: 143 MG/DL (ref 70–99)
HCT VFR BLD AUTO: 36.7 % (ref 35–53)
HGB BLD-MCNC: 12.3 G/DL (ref 13.3–17.7)
MCH RBC QN AUTO: 30.5 PG (ref 26.5–33)
MCHC RBC AUTO-ENTMCNC: 33.5 G/DL (ref 31.5–36.5)
MCV RBC AUTO: 91 FL (ref 78–100)
PLATELET # BLD AUTO: 179 10E3/UL (ref 150–450)
POTASSIUM SERPL-SCNC: 4.2 MMOL/L (ref 3.4–5.3)
RBC # BLD AUTO: 4.03 10E6/UL (ref 3.8–5.9)
SODIUM SERPL-SCNC: 134 MMOL/L (ref 135–145)
WBC # BLD AUTO: 11.7 10E3/UL (ref 4–11)

## 2024-06-29 PROCEDURE — 999N000128 HC STATISTIC PERIPHERAL IV START W/O US GUIDANCE

## 2024-06-29 PROCEDURE — 85027 COMPLETE CBC AUTOMATED: CPT | Performed by: STUDENT IN AN ORGANIZED HEALTH CARE EDUCATION/TRAINING PROGRAM

## 2024-06-29 PROCEDURE — 97161 PT EVAL LOW COMPLEX 20 MIN: CPT | Mod: GP

## 2024-06-29 PROCEDURE — 250N000011 HC RX IP 250 OP 636: Performed by: STUDENT IN AN ORGANIZED HEALTH CARE EDUCATION/TRAINING PROGRAM

## 2024-06-29 PROCEDURE — 80048 BASIC METABOLIC PNL TOTAL CA: CPT | Performed by: STUDENT IN AN ORGANIZED HEALTH CARE EDUCATION/TRAINING PROGRAM

## 2024-06-29 PROCEDURE — 97530 THERAPEUTIC ACTIVITIES: CPT | Mod: GP

## 2024-06-29 PROCEDURE — 120N000002 HC R&B MED SURG/OB UMMC

## 2024-06-29 PROCEDURE — 36415 COLL VENOUS BLD VENIPUNCTURE: CPT | Performed by: STUDENT IN AN ORGANIZED HEALTH CARE EDUCATION/TRAINING PROGRAM

## 2024-06-29 PROCEDURE — 97116 GAIT TRAINING THERAPY: CPT | Mod: GP

## 2024-06-29 PROCEDURE — 250N000013 HC RX MED GY IP 250 OP 250 PS 637: Performed by: STUDENT IN AN ORGANIZED HEALTH CARE EDUCATION/TRAINING PROGRAM

## 2024-06-29 RX ADMIN — HYDROMORPHONE HYDROCHLORIDE 0.2 MG: 1 INJECTION, SOLUTION INTRAMUSCULAR; INTRAVENOUS; SUBCUTANEOUS at 01:25

## 2024-06-29 RX ADMIN — CEFAZOLIN 1 G: 1 INJECTION, POWDER, FOR SOLUTION INTRAMUSCULAR; INTRAVENOUS at 20:17

## 2024-06-29 RX ADMIN — OXYCODONE HYDROCHLORIDE 5 MG: 5 TABLET ORAL at 21:43

## 2024-06-29 RX ADMIN — ACETAMINOPHEN 975 MG: 325 TABLET, FILM COATED ORAL at 17:36

## 2024-06-29 RX ADMIN — CEFAZOLIN 1 G: 1 INJECTION, POWDER, FOR SOLUTION INTRAMUSCULAR; INTRAVENOUS at 04:54

## 2024-06-29 RX ADMIN — ACETAMINOPHEN 975 MG: 325 TABLET, FILM COATED ORAL at 10:56

## 2024-06-29 RX ADMIN — METHOCARBAMOL 750 MG: 750 TABLET ORAL at 10:56

## 2024-06-29 RX ADMIN — CEFAZOLIN 1 G: 1 INJECTION, POWDER, FOR SOLUTION INTRAMUSCULAR; INTRAVENOUS at 12:57

## 2024-06-29 RX ADMIN — HYDROXYZINE HYDROCHLORIDE 25 MG: 25 TABLET, FILM COATED ORAL at 08:07

## 2024-06-29 RX ADMIN — POLYETHYLENE GLYCOL 3350 17 G: 17 POWDER, FOR SOLUTION ORAL at 08:07

## 2024-06-29 RX ADMIN — DOCUSATE SODIUM 50 MG AND SENNOSIDES 8.6 MG 1 TABLET: 8.6; 5 TABLET, FILM COATED ORAL at 08:07

## 2024-06-29 RX ADMIN — OXYCODONE HYDROCHLORIDE 5 MG: 5 TABLET ORAL at 08:07

## 2024-06-29 RX ADMIN — HYDROMORPHONE HYDROCHLORIDE 0.2 MG: 1 INJECTION, SOLUTION INTRAMUSCULAR; INTRAVENOUS; SUBCUTANEOUS at 06:41

## 2024-06-29 RX ADMIN — DOCUSATE SODIUM 50 MG AND SENNOSIDES 8.6 MG 1 TABLET: 8.6; 5 TABLET, FILM COATED ORAL at 20:20

## 2024-06-29 RX ADMIN — OXYCODONE HYDROCHLORIDE 5 MG: 5 TABLET ORAL at 12:56

## 2024-06-29 RX ADMIN — ACETAMINOPHEN 975 MG: 325 TABLET, FILM COATED ORAL at 01:32

## 2024-06-29 RX ADMIN — OXYCODONE HYDROCHLORIDE 5 MG: 5 TABLET ORAL at 03:09

## 2024-06-29 RX ADMIN — OXYCODONE HYDROCHLORIDE 5 MG: 5 TABLET ORAL at 17:36

## 2024-06-29 ASSESSMENT — ACTIVITIES OF DAILY LIVING (ADL)
ADLS_ACUITY_SCORE: 21
ADLS_ACUITY_SCORE: 20
ADLS_ACUITY_SCORE: 20
ADLS_ACUITY_SCORE: 21
ADLS_ACUITY_SCORE: 20
ADLS_ACUITY_SCORE: 20
ADLS_ACUITY_SCORE: 21
ADLS_ACUITY_SCORE: 20
ADLS_ACUITY_SCORE: 21

## 2024-06-29 NOTE — PROGRESS NOTES
IP PT Evaluation:     06/29/24 1211   Appointment Info   Signing Clinician's Name / Credentials (PT) Deyvi Aviles, PT, DPT   Living Environment   People in Home significant other;child(vignesh), dependent   Current Living Arrangements house   Home Accessibility stairs to enter home;stairs within home   Number of Stairs, Main Entrance 6  (3+3)   Stair Railings, Main Entrance   (one railing)   Number of Stairs, Within Home, Primary greater than 10 stairs  (13-14)   Stair Railings, Within Home, Primary   (one railing)   Transportation Anticipated car, drives self;family or friend will provide   Living Environment Comments Lives with SO and 5 kids (9-24). Bedroom/bathroom upstairs.   Self-Care   Equipment Currently Used at Home none   Fall history within last six months no   Activity/Exercise/Self-Care Comment IND with mobility and I/ADLs at baseline.   General Information   Onset of Illness/Injury or Date of Surgery 06/28/24   Referring Physician Mik Brar MD   Patient/Family Therapy Goals Statement (PT) Return home   Pertinent History of Current Problem (include personal factors and/or comorbidities that impact the POC) Per EMR: 58 year old y/o adult POD#1 s/p minimal depth vaginoplasty   Existing Precautions/Restrictions other (see comments)  (penguin walking)   General Observations Pt supine with SO at bedside, agreeable to session.   Cognition   Affect/Mental Status (Cognition) WFL   Pain Assessment   Patient Currently in Pain   (2/10 surgical site pain at rest)   Posture    Posture Not impaired   Range of Motion (ROM)   Range of Motion ROM is WFL   Strength (Manual Muscle Testing)   Strength (Manual Muscle Testing) Deficits observed during functional mobility   Strength Comments Grossly deconditioned; pain limited; moves all extremities against gravity; no focal deficits noted   Bed Mobility   Comment, (Bed Mobility) Reji supine>sit   Transfers   Comment, (Transfers) CGA sit>stand   Gait/Stairs (Locomotion)    Comment, (Gait/Stairs) CGA, 1UE on IV   Balance   Balance Comments IND sitting; CGA static/dynamic stance with 1UE on IV   Sensory Examination   Sensory Perception patient reports no sensory changes   Coordination   Coordination no deficits were identified   Clinical Impression   Criteria for Skilled Therapeutic Intervention Yes, treatment indicated   PT Diagnosis (PT) impaired functional mobility   Influenced by the following impairments deconditioning, pain, surgical precautions   Functional limitations due to impairments gait, stairs, bed mobility, transfers, balance, activity tolerance   Clinical Presentation (PT Evaluation Complexity) stable   Clinical Presentation Rationale Clinical judgment   Clinical Decision Making (Complexity) low complexity   Planned Therapy Interventions (PT) balance training;bed mobility training;home exercise program;gait training;patient/family education;stair training;strengthening;transfer training;progressive activity/exercise;risk factor education;home program guidelines   Risk & Benefits of therapy have been explained evaluation/treatment results reviewed;care plan/treatment goals reviewed;risks/benefits reviewed;current/potential barriers reviewed;participants voiced agreement with care plan;participants included;patient;spouse/significant other   PT Total Evaluation Time   PT Eval, Low Complexity Minutes (67762) 6   Physical Therapy Goals   PT Frequency Daily   PT Predicted Duration/Target Date for Goal Attainment 07/13/24   PT Goals Bed Mobility;Transfers;Gait;Stairs   PT: Bed Mobility Independent;Supine to/from sit;Within precautions   PT: Transfers Independent;Sit to/from stand   PT: Gait Independent;Greater than 200 feet;Within precautions   PT: Stairs Modified independent;Greater than 10 stairs  (railings per home setup)   PT Discharge Planning   PT Plan stairs, flat bed mobility   PT Discharge Recommendation (DC Rec) home with assist   PT Rationale for DC Rec Pt  mobilizing well post op. Anticipate steady progress to enable discharge home with family assist when medically ready.   PT Brief overview of current status SBA; ambulate with family/staff as able   Total Session Time   Total Session Time (sum of timed and untimed services) 6

## 2024-06-29 NOTE — PLAN OF CARE
"/70   Pulse 72   Temp 97.3  F (36.3  C) (Core)   Resp 12   Ht 1.854 m (6' 1\")   Wt 72.1 kg (158 lb 15.2 oz)   SpO2 100%   BMI 20.97 kg/m      Pt Is A&Ox4. VSS. Pain managed with scheduled pain meds. Vaginal bolster in place. Pt reports numbness and tingling sensation in left hand-also  refused capno [ MD notified]. LILIA to bulb suction. Bueno in place with AUOP. Tolerating clear liquid diet. Will continue plan of care.  "

## 2024-06-29 NOTE — PLAN OF CARE
Goal Outcome Evaluation:      Plan of Care Reviewed With: patient    Overall Patient Progress: improvingOverall Patient Progress: improving    Alert and oriented. Calm and cooperative. Tolerated sitting at edge of bed well this morning, patient has since ambulated in hallway with SBA. Abdominal and perineal incision pain well managed with prn Oxycodone and Tylenol. Atarax and Robaxin also each given once. Bueno patent with good urine output. Tolerating regular diet and fluids. LILIA x1.   Patient anticipating discharge tomorrow post reveal.   Continue with POC.

## 2024-06-29 NOTE — PROGRESS NOTES
"Urology  Progress Note    The author of this note may not be on call. To avoid delays in patient care please use AMCOM to find correct on call person    24 hour events/Subjective:     - No acute events overnight   - Pain well controlled on current regimen    - Tolerating clear liquid diet ; no nausea or vomiting   - Passing flatus, no bowel movement   - Ambulated yesterday      Exam  /67 (BP Location: Left arm)   Pulse 70   Temp 98.1  F (36.7  C) (Oral)   Resp 16   Ht 1.854 m (6' 1\")   Wt 72.1 kg (158 lb 15.2 oz)   SpO2 93%   BMI 20.97 kg/m    No acute distress  Unlabored breathing on RA  Abdomen soft, appropriately tender, nondistended. Incisions cdi  Bolster in place, moderately saturated. Mild mons hematoma  LILIA serosanguinous and blackman draining clear urine        Intake/Output Summary (Last 24 hours) at 6/29/2024 0739  Last data filed at 6/29/2024 0445  Gross per 24 hour   Intake 1400 ml   Output 1345 ml   Net 55 ml       /300  LILIA -/45    Labs  Recent Labs   Lab Test 06/29/24  0449   WBC 11.7*   HGB 12.3*   CR 1.12       Assessment/Plan  58 year old y/o adult POD#1 s/p minimal depth vaginoplasty    Note - plan changes for today are in bold below.    Neuro: pain control: PRN oxycodone 5-10 mg q3h , PRN dilaudid 0.2 - 0.4 mg q2h, and scheduled tylenol   CV: HDS   Pulm: incentive spirometry while awake  FEN/GI: ADAT diet, MIVF @ 50/hr  Endo: relatively euglycemic   : TOV and reveal POD 2-3  Heme/ID: monitor for s/s of infection; monitor Hb and transfuse as indicated. Ancef while in house  Activity: Up as tolerated  Ambulate at least TID   PT ordered  Ppx: SCDs, ambulation  Home RX on HOLD: home gender hormones  Dispo: anticipate discharge to home after reveal      Seen and examined with the chief resident. Will discuss with Troy Valverde MD.    Sushil Campoverde MD, PGY-2  Urology Resident      PTA medications:   Prior to Admission medications    Medication Sig Start Date End Date Taking? " "Authorizing Provider   acetaminophen (TYLENOL) 325 MG tablet Take 650 mg by mouth every 6 hours as needed for mild pain or fever 8/27/23  Yes Reported, Patient   buPROPion (WELLBUTRIN SR) 100 MG 12 hr tablet Take 200 mg by mouth daily   Yes Reported, Patient   ibuprofen (ADVIL/MOTRIN) 400 MG tablet Take 400 mg by mouth every 6 hours as needed for mild pain, inflammatory pain or fever 8/27/23  Yes Reported, Patient   estradiol (ESTRACE) 2 MG tablet Take 4 tablets (8 mg) by mouth daily.    Reported, Patient   progesterone (PROMETRIUM) 100 MG capsule Take 100 mg by mouth at bedtime    Reported, Patient   rOPINIRole (REQUIP) 0.5 MG tablet Take 0.5 mg by mouth daily as needed 2/9/23   Reported, Patient   spironolactone (ALDACTONE) 100 MG tablet Take 100 mg by mouth daily    Reported, Patient          Contacting the urology team: Please see [facility] and page on-call clinician with any questions or concerns regarding this patient. Note writer may be unavailable.     To access [employer] from intranet: under \"Applications\" --> \"Business Applications\" select \"[employer]\" and search \"Urology Adult & Pediatric/[facility].\" Please note that any question about a urology inpatient, [place] or [place], should go to job code [id number].     "

## 2024-06-29 NOTE — PLAN OF CARE
Goal Outcome Evaluation:  [id number]  Pt A&O, VSS on RA. No complaints of nausea, vomiting and scop patch in place. Pain managed with dilaudid, oxycodone, scheduled tylenol, and ice packs. On clear liquids, tolerating fair. No flatus or BM passed. Not OOB yet. IV fluids running @50ml through PIV.

## 2024-06-29 NOTE — PROGRESS NOTES
Admitted/transferred from: PACU  2 RN full   skin assessment completed by Shaista Malone, STEPHANIE and Cate SACNHES  Skin assessment finding: skin intact, no problems ex Left scrape on knee, vaginal bolster, PIV, Bueno, JPx1  Interventions/actions: other None      Will continue to monitor.

## 2024-06-30 ENCOUNTER — APPOINTMENT (OUTPATIENT)
Dept: PHYSICAL THERAPY | Facility: CLINIC | Age: 59
DRG: 876 | End: 2024-06-30
Attending: UROLOGY
Payer: COMMERCIAL

## 2024-06-30 VITALS
RESPIRATION RATE: 18 BRPM | HEART RATE: 72 BPM | DIASTOLIC BLOOD PRESSURE: 67 MMHG | HEIGHT: 73 IN | OXYGEN SATURATION: 99 % | WEIGHT: 176.37 LBS | TEMPERATURE: 98.2 F | BODY MASS INDEX: 23.37 KG/M2 | SYSTOLIC BLOOD PRESSURE: 127 MMHG

## 2024-06-30 LAB
ANION GAP SERPL CALCULATED.3IONS-SCNC: 8 MMOL/L (ref 7–15)
BUN SERPL-MCNC: 13.5 MG/DL (ref 8–23)
CALCIUM SERPL-MCNC: 7.8 MG/DL (ref 8.6–10)
CHLORIDE SERPL-SCNC: 105 MMOL/L (ref 98–107)
CREAT SERPL-MCNC: 0.91 MG/DL (ref 0.51–1.17)
DEPRECATED HCO3 PLAS-SCNC: 24 MMOL/L (ref 22–29)
EGFRCR SERPLBLD CKD-EPI 2021: >90 ML/MIN/1.73M2
ERYTHROCYTE [DISTWIDTH] IN BLOOD BY AUTOMATED COUNT: 12.1 % (ref 10–15)
GLUCOSE SERPL-MCNC: 135 MG/DL (ref 70–99)
HCT VFR BLD AUTO: 31.3 % (ref 35–53)
HGB BLD-MCNC: 10.7 G/DL (ref 13.3–17.7)
MCH RBC QN AUTO: 31.2 PG (ref 26.5–33)
MCHC RBC AUTO-ENTMCNC: 34.2 G/DL (ref 31.5–36.5)
MCV RBC AUTO: 91 FL (ref 78–100)
PLATELET # BLD AUTO: 165 10E3/UL (ref 150–450)
POTASSIUM SERPL-SCNC: 4 MMOL/L (ref 3.4–5.3)
RBC # BLD AUTO: 3.43 10E6/UL (ref 3.8–5.9)
SODIUM SERPL-SCNC: 137 MMOL/L (ref 135–145)
WBC # BLD AUTO: 7.8 10E3/UL (ref 4–11)

## 2024-06-30 PROCEDURE — 36415 COLL VENOUS BLD VENIPUNCTURE: CPT | Performed by: STUDENT IN AN ORGANIZED HEALTH CARE EDUCATION/TRAINING PROGRAM

## 2024-06-30 PROCEDURE — 97530 THERAPEUTIC ACTIVITIES: CPT | Mod: GP

## 2024-06-30 PROCEDURE — 97116 GAIT TRAINING THERAPY: CPT | Mod: GP

## 2024-06-30 PROCEDURE — 258N000003 HC RX IP 258 OP 636: Performed by: STUDENT IN AN ORGANIZED HEALTH CARE EDUCATION/TRAINING PROGRAM

## 2024-06-30 PROCEDURE — 85027 COMPLETE CBC AUTOMATED: CPT | Performed by: STUDENT IN AN ORGANIZED HEALTH CARE EDUCATION/TRAINING PROGRAM

## 2024-06-30 PROCEDURE — 250N000011 HC RX IP 250 OP 636: Performed by: STUDENT IN AN ORGANIZED HEALTH CARE EDUCATION/TRAINING PROGRAM

## 2024-06-30 PROCEDURE — 80048 BASIC METABOLIC PNL TOTAL CA: CPT | Performed by: STUDENT IN AN ORGANIZED HEALTH CARE EDUCATION/TRAINING PROGRAM

## 2024-06-30 PROCEDURE — 250N000013 HC RX MED GY IP 250 OP 250 PS 637: Performed by: STUDENT IN AN ORGANIZED HEALTH CARE EDUCATION/TRAINING PROGRAM

## 2024-06-30 RX ORDER — ACETAMINOPHEN 325 MG/1
650 TABLET ORAL EVERY 4 HOURS PRN
Qty: 100 TABLET | Refills: 0 | Status: SHIPPED | OUTPATIENT
Start: 2024-07-01

## 2024-06-30 RX ORDER — OXYCODONE HYDROCHLORIDE 5 MG/1
5 TABLET ORAL EVERY 6 HOURS PRN
Qty: 12 TABLET | Refills: 0 | Status: SHIPPED | OUTPATIENT
Start: 2024-06-30 | End: 2024-07-03

## 2024-06-30 RX ORDER — SENNA AND DOCUSATE SODIUM 50; 8.6 MG/1; MG/1
1 TABLET, FILM COATED ORAL AT BEDTIME
Qty: 14 TABLET | Refills: 0 | Status: SHIPPED | OUTPATIENT
Start: 2024-06-30

## 2024-06-30 RX ADMIN — POLYETHYLENE GLYCOL 3350 17 G: 17 POWDER, FOR SOLUTION ORAL at 08:52

## 2024-06-30 RX ADMIN — SODIUM CHLORIDE: 9 INJECTION, SOLUTION INTRAVENOUS at 01:02

## 2024-06-30 RX ADMIN — DOCUSATE SODIUM 50 MG AND SENNOSIDES 8.6 MG 1 TABLET: 8.6; 5 TABLET, FILM COATED ORAL at 08:52

## 2024-06-30 RX ADMIN — CEFAZOLIN 1 G: 1 INJECTION, POWDER, FOR SOLUTION INTRAMUSCULAR; INTRAVENOUS at 04:08

## 2024-06-30 RX ADMIN — ACETAMINOPHEN 975 MG: 325 TABLET, FILM COATED ORAL at 02:34

## 2024-06-30 RX ADMIN — CEFAZOLIN 1 G: 1 INJECTION, POWDER, FOR SOLUTION INTRAMUSCULAR; INTRAVENOUS at 12:45

## 2024-06-30 RX ADMIN — ACETAMINOPHEN 975 MG: 325 TABLET, FILM COATED ORAL at 10:34

## 2024-06-30 ASSESSMENT — ACTIVITIES OF DAILY LIVING (ADL)
ADLS_ACUITY_SCORE: 21
ADLS_ACUITY_SCORE: 23
ADLS_ACUITY_SCORE: 23
ADLS_ACUITY_SCORE: 21
ADLS_ACUITY_SCORE: 23
ADLS_ACUITY_SCORE: 21
ADLS_ACUITY_SCORE: 23

## 2024-06-30 NOTE — PLAN OF CARE
Goal Outcome Evaluation:    VSS on RA. Pain managed with oxycodone and scheduled tylenol. LILIA with minimal output, blackman draining adequate UOP, bolster in place with dried drainage. On regular diet, tolerating. Up with SBA.

## 2024-06-30 NOTE — PROGRESS NOTES
Pt discharged to home with spouse. PIV removed. Discharge instructions and follow up appt reviewed with pt and spouse using teach back.

## 2024-06-30 NOTE — PLAN OF CARE
Goal Outcome Evaluation:  [id number]: VSS. A/O x4. Mild pain, scheduled Tylenol given. Blackman and bolster removed this a.m. UOP ind 2x, retain 472 mLs for bladder scan, team notified. Reg diet, good appetite. Plans for discharge underway pending team to assess retention post blackman. No BM's this shift. Will continue with plan of care and continue to monitor.

## 2024-06-30 NOTE — DISCHARGE SUMMARY
Physician Discharge Summary     Patient ID:  Yunior Holden  [id number]  58 year old  1965    Admit date: 6/28/2024    Discharge date and time: 6/30/2024     Admitting Physician: Troy Valverde MD     Discharge Physician: Syed Campoverde MD    Admission Diagnoses: Gender dysphoria [F64.9]    Discharge Diagnoses: same    Admission Condition: good    Discharged Condition: good    Indication for Admission: post op cares    Hospital Course:   The patient's hospital course was unremarkable.  Yunior Holden recovered as anticipated and experienced no post-operative complications.      On POD#2 patient was ambulating without assitance, tolerating the discharge diet, had pain controlled with PO medications to go home with, and requiring no IV medications or fluids. Patient was discharged home with appropriate contact information, follow-up and instructions as seen below in the discharge paperwork.    Discharge Exam:  No acute distress  Unlabored breathing on RA  Abdomen soft, appropriately tender, nondistended. Incisions cdi  Mild mons hematoma    Disposition: home    Patient Instructions:   Current Discharge Medication List        CONTINUE these medications which have NOT CHANGED    Details   acetaminophen (TYLENOL) 325 MG tablet Take 650 mg by mouth every 6 hours as needed for mild pain or fever      buPROPion (WELLBUTRIN SR) 100 MG 12 hr tablet Take 200 mg by mouth daily      ibuprofen (ADVIL/MOTRIN) 400 MG tablet Take 400 mg by mouth every 6 hours as needed for mild pain, inflammatory pain or fever      estradiol (ESTRACE) 2 MG tablet Take 4 tablets (8 mg) by mouth daily.      progesterone (PROMETRIUM) 100 MG capsule Take 100 mg by mouth at bedtime      rOPINIRole (REQUIP) 0.5 MG tablet Take 0.5 mg by mouth daily as needed      spironolactone (ALDACTONE) 100 MG tablet Take 100 mg by mouth daily           After cares per discharge instructions    Follow-up with Dr Valverde as scheduled.    Signed:  Sushil Campoverde,  MD  6/30/2024  2:00 PM

## 2024-06-30 NOTE — PROGRESS NOTES
"Urology  Progress Note    The author of this note may not be on call. To avoid delays in patient care please use AMCOM to find correct on call person    24 hour events/Subjective:     - No acute events overnight   - Pain well controlled on current regimen    - Tolerating clear liquid diet ; no nausea or vomiting   - Passing flatus, no bowel movement   - Ambulated yesterday      Exam  /62 (BP Location: Left arm)   Pulse 67   Temp 98.1  F (36.7  C) (Oral)   Resp 18   Ht 1.854 m (6' 1\")   Wt 80 kg (176 lb 5.9 oz)   SpO2 100%   BMI 23.27 kg/m    No acute distress  Unlabored breathing on RA  Abdomen soft, appropriately tender, nondistended. Incisions cdi  Bolster in place, moderately saturated. Mild mons hematoma  LILIA serosanguinous and blackman draining clear urine        Intake/Output Summary (Last 24 hours) at 6/29/2024 0739  Last data filed at 6/29/2024 0445  Gross per 24 hour   Intake 1400 ml   Output 1345 ml   Net 55 ml       /300  LILIA 65/10    Labs  Recent Labs   Lab Test 06/30/24  0644 06/29/24  0449   WBC 7.8 11.7*   HGB 10.7* 12.3*   CR  --  1.12       Assessment/Plan  58 year old y/o adult POD#2 s/p minimal depth vaginoplasty    Note - plan changes for today are in bold below.    Neuro: pain control: PRN oxycodone 5-10 mg q3h , PRN dilaudid 0.2 - 0.4 mg q2h, and scheduled tylenol   CV: HDS   Pulm: incentive spirometry while awake  FEN/GI: ADAT diet, MIVF @ 50/hr  Endo: relatively euglycemic   : TOV and reveal today  Heme/ID: monitor for s/s of infection; monitor Hb and transfuse as indicated. Ancef while in house  Activity: Up as tolerated  Ambulate at least TID   PT ordered  Ppx: SCDs, ambulation  Home RX on HOLD: home gender hormones  Dispo: anticipate discharge to home today after reveal      Seen and examined with the chief resident. Will discuss with Troy Valverde MD.    Sushil Campoverde MD, PGY-2  Urology Resident      PTA medications:   Prior to Admission medications    Medication " "Sig Start Date End Date Taking? Authorizing Provider   acetaminophen (TYLENOL) 325 MG tablet Take 650 mg by mouth every 6 hours as needed for mild pain or fever 8/27/23  Yes Reported, Patient   buPROPion (WELLBUTRIN SR) 100 MG 12 hr tablet Take 200 mg by mouth daily   Yes Reported, Patient   ibuprofen (ADVIL/MOTRIN) 400 MG tablet Take 400 mg by mouth every 6 hours as needed for mild pain, inflammatory pain or fever 8/27/23  Yes Reported, Patient   estradiol (ESTRACE) 2 MG tablet Take 4 tablets (8 mg) by mouth daily.    Reported, Patient   progesterone (PROMETRIUM) 100 MG capsule Take 100 mg by mouth at bedtime    Reported, Patient   rOPINIRole (REQUIP) 0.5 MG tablet Take 0.5 mg by mouth daily as needed 2/9/23   Reported, Patient   spironolactone (ALDACTONE) 100 MG tablet Take 100 mg by mouth daily    Reported, Patient          Contacting the urology team: Please see [facility] and page on-call clinician with any questions or concerns regarding this patient. Note writer may be unavailable.     To access [employer] from intranet: under \"Applications\" --> \"Business Applications\" select \"[employer] Smart[employer]\" and search \"Urology Adult & Pediatric/[facility].\" Please note that any question about a urology inpatient, [place] or [place], should go to job code [id number].     "

## 2024-07-01 ENCOUNTER — TELEPHONE (OUTPATIENT)
Dept: PLASTIC SURGERY | Facility: CLINIC | Age: 59
End: 2024-07-01
Payer: COMMERCIAL

## 2024-07-01 NOTE — TELEPHONE ENCOUNTER
Pt had minimal depth vaginoplasty on 6/28/24 with Dr. Valverde. Pt discharged from the hospital yesterday. Today is POD # 3. Called to check on patient postoperatively. Unable to reach, left voicemail with callback number. Will send [employer] message as well.

## 2024-07-02 ENCOUNTER — TELEPHONE (OUTPATIENT)
Dept: SURGERY | Facility: CLINIC | Age: 59
End: 2024-07-02
Payer: COMMERCIAL

## 2024-07-02 LAB
PATH REPORT.COMMENTS IMP SPEC: NORMAL
PATH REPORT.COMMENTS IMP SPEC: NORMAL
PATH REPORT.FINAL DX SPEC: NORMAL
PATH REPORT.GROSS SPEC: NORMAL
PATH REPORT.RELEVANT HX SPEC: NORMAL
PHOTO IMAGE: NORMAL

## 2024-07-03 NOTE — PROGRESS NOTES
"Telephone Encounter  Author: Beverly Jeter MD, MD    Date: 8:05 PM; 7/2/2024  Patient Name: Ciara Holden  MRN: [id number]    Paged by  regarding concern for urinary retention. Ciara Holden called requesting to speak with urology on call.  I contacted the patient.  They stated that they feel they have not been able to void normally since discharge. They has been voiding through out the day (last void approximately 1 hour ago); however, they is concerned about the sensation that \"bladder is not telling their brain to empty like it usually does.\" They endorses mild sensation of abdominal fullness, but then they are eventually able to void. Denies abdominal pain or discomfort.    We discussed that acute urinary retention after surgery can happen, especially given their borderline TOV prior to discharge; however, given they they are still voiding through out the day without abdominal discomfort, their signs and symptoms do not seem acutely concerning.    I discussed with the patient that a phone conversation does not replace a physical exam.   Offered evaluation in the ED and recommend she present to the ED if she develops the inability to urinate and/or abdominal discomfort or pain. Questions solicited & answered.  Other strict return precautions discussed, including but not limited to: fevers > 101.4, chills, an inability to keep food or fluids down, pain not controlled with oral medications.     Beverly Jeter MD  Urology Resident, PGY2    --    8:05 PM; 7/2/2024      "

## 2024-07-08 ENCOUNTER — OFFICE VISIT (OUTPATIENT)
Dept: PLASTIC SURGERY | Facility: CLINIC | Age: 59
End: 2024-07-08
Payer: COMMERCIAL

## 2024-07-08 VITALS
HEIGHT: 73 IN | OXYGEN SATURATION: 98 % | WEIGHT: 167.1 LBS | BODY MASS INDEX: 22.15 KG/M2 | TEMPERATURE: 97.9 F | HEART RATE: 56 BPM | SYSTOLIC BLOOD PRESSURE: 116 MMHG | DIASTOLIC BLOOD PRESSURE: 71 MMHG

## 2024-07-08 DIAGNOSIS — Z87.890 STATUS POST GENDER AFFIRMATION SURGERY: Primary | ICD-10-CM

## 2024-07-08 PROCEDURE — 99024 POSTOP FOLLOW-UP VISIT: CPT | Performed by: NURSE PRACTITIONER

## 2024-07-08 ASSESSMENT — PAIN SCALES - GENERAL: PAINLEVEL: MILD PAIN (3)

## 2024-07-08 NOTE — PROGRESS NOTES
"SUBJECTIVE:  Ciara is a 58 year old who underwent minimal depth vaginoplasty with Dr Valverde on 6/28/24. She states she is \"sore, but I am really good.\" She is sore most of the time, but feels some stomach discomfort from the regular ibuprofen/tylenol, so is taking it less often now. She had one episode of bleeding while using the bathroom a few days ago but no further bleeding. She did speak with resident on call about this and was reassured. She has not yet looked at her vulva or incisions, but wife has not mentioned any wounds or problems. Urination has been okay. Some days stream is powerful and less spraying, but some dribbling or spraying at other times.  She is happy with things so far.    OBJECTIVE:  /71 (BP Location: Left arm, Patient Position: Sitting, Cuff Size: Adult Regular)   Pulse 56   Temp 97.9  F (36.6  C) (Oral)   Ht 1.854 m (6' 1\")   Wt 75.8 kg (167 lb 1.6 oz)   SpO2 98%   BMI 22.05 kg/m     General: NAD  Vulvar incisions c/d/I without wounds or drainage  Good definition of labia minora  Clitoris just visible under hooding. Visible portion healthy and viable with adequate hooding  Mild edema of labia minora and vulva  Small area of darkly bruised tissue along right incision.   Right labia minora with some surface skin sloughing  Bilateral inner thigh bruising which is improving      ASSESSMENT/PLAN:  S/P minimal depth vaginoplasty  Healing well with some areas of skin sloughing (likely from moisture or localized surface hematoma)  Visual tour of vulva using handheld mirror during visit  Okay for clitoral stimulation at 4 weeks PO  Encouraged continued good hygiene  RTC as scheduled in one month with Dr Abram Frey, APRN, CNP    A total of 25 minutes was spent today with patient, reviewing records, completing charting, and other tasks as detailed above.   "

## 2024-07-08 NOTE — LETTER
"7/8/2024       RE: Yunior Holden  [address] [address]     Dear Colleague,    Thank you for referring your patient, Yunior Holden, to the [facility] [place] at [facility]. Please see a copy of my visit note below.    SUBJECTIVE:  Ciara is a 58 year old who underwent minimal depth vaginoplasty with Dr Valverde on 6/28/24. She states she is \"sore, but I am really good.\" She is sore most of the time, but feels some stomach discomfort from the regular ibuprofen/tylenol, so is taking it less often now. She had one episode of bleeding while using the bathroom a few days ago but no further bleeding. She did speak with resident on call about this and was reassured. She has not yet looked at her vulva or incisions, but wife has not mentioned any wounds or problems. Urination has been okay. Some days stream is powerful and less spraying, but some dribbling or spraying at other times.  She is happy with things so far.    OBJECTIVE:  /71 (BP Location: Left arm, Patient Position: Sitting, Cuff Size: Adult Regular)   Pulse 56   Temp 97.9  F (36.6  C) (Oral)   Ht 1.854 m (6' 1\")   Wt 75.8 kg (167 lb 1.6 oz)   SpO2 98%   BMI 22.05 kg/m     General: NAD  Vulvar incisions c/d/I without wounds or drainage  Good definition of labia minora  Clitoris just visible under hooding. Visible portion healthy and viable with adequate hooding  Mild edema of labia minora and vulva  Small area of darkly bruised tissue along right incision.   Right labia minora with some surface skin sloughing  Bilateral inner thigh bruising which is improving      ASSESSMENT/PLAN:  S/P minimal depth vaginoplasty  Healing well with some areas of skin sloughing (likely from moisture or localized surface hematoma)  Visual tour of vulva using handheld mirror during visit  Okay for clitoral stimulation at 4 weeks PO  Encouraged continued good " hygiene  RTC as scheduled in one month with Dr Abram Frey, APRN, CNP    A total of 25 minutes was spent today with patient, reviewing records, completing charting, and other tasks as detailed above.          Island Pedicle Flap With Canthal Suspension Text: The defect edges were debeveled with a #15 scalpel blade.  Given the location of the defect, shape of the defect and the proximity to free margins an island pedicle advancement flap was deemed most appropriate.  Using a sterile surgical marker, an appropriate advancement flap was drawn incorporating the defect, outlining the appropriate donor tissue and placing the expected incisions within the relaxed skin tension lines where possible. The area thus outlined was incised deep to adipose tissue with a #15 scalpel blade.  The skin margins were undermined to an appropriate distance in all directions around the primary defect and laterally outward around the island pedicle utilizing iris scissors.  There was minimal undermining beneath the pedicle flap. A suspension suture was placed in the canthal tendon to prevent tension and prevent ectropion.

## 2024-07-08 NOTE — NURSING NOTE
"Chief Complaint   Patient presents with    Surgical Followup     2 week post-op, DOS 6/28/24.       Vitals:    07/08/24 0838   BP: 116/71   BP Location: Left arm   Patient Position: Sitting   Cuff Size: Adult Regular   Pulse: 56   Temp: 97.9  F (36.6  C)   TempSrc: Oral   SpO2: 98%   Weight: 167 lb 1.6 oz   Height: 6' 1\"       Body mass index is 22.05 kg/m .    Patient reports mild vaginal pain (3/10).    Rashaun Rankin, EMT    "

## 2024-07-26 ENCOUNTER — TELEPHONE (OUTPATIENT)
Dept: PLASTIC SURGERY | Facility: CLINIC | Age: 59
End: 2024-07-26
Payer: COMMERCIAL

## 2024-07-26 NOTE — TELEPHONE ENCOUNTER
Pt left a voicemail stating that she has concerns about stiches dangling from her surgical area. Pt had minimal depth vaginoplasty with Dr. Valverde on 6/28/24.Called pt back to discuss. Explained that stitches do dissolve and/or fall out after surgery and that some last for several weeks while others for a several months. Asked pt to send in picture she took. Reviewed picture. Explained to pt that it is difficult to know for sure from a picture, but it looks as though she had some tissue loss. Explained that this can happen after surgery and that she may notice the tissue fall off in her underwear or in the shower. Explained that there is nothing she needs to do to care for the area other than shower daily. Reassured pt that the other areas of her vulva look healthy, pink, and are healing well. She thanked writer for reassurance. She has an appointment with Dr. Valverde on 8/13. Explained that she can see Barbara for a sooner appointment if she wants to have our team assess in person before 8/13. Pt does not want to schedule an appointment right now but will let us know if she changes her mind.

## 2024-08-04 ENCOUNTER — HEALTH MAINTENANCE LETTER (OUTPATIENT)
Age: 59
End: 2024-08-04

## 2024-08-13 ENCOUNTER — OFFICE VISIT (OUTPATIENT)
Dept: PLASTIC SURGERY | Facility: CLINIC | Age: 59
End: 2024-08-13
Payer: COMMERCIAL

## 2024-08-13 VITALS
DIASTOLIC BLOOD PRESSURE: 73 MMHG | BODY MASS INDEX: 22 KG/M2 | HEIGHT: 73 IN | OXYGEN SATURATION: 98 % | WEIGHT: 166 LBS | SYSTOLIC BLOOD PRESSURE: 131 MMHG | HEART RATE: 63 BPM

## 2024-08-13 DIAGNOSIS — F64.9 GENDER DYSPHORIA: Primary | ICD-10-CM

## 2024-08-13 PROCEDURE — 99024 POSTOP FOLLOW-UP VISIT: CPT | Performed by: UROLOGY

## 2024-08-13 ASSESSMENT — PAIN SCALES - GENERAL: PAINLEVEL: NO PAIN (1)

## 2024-08-13 NOTE — LETTER
"8/13/2024       RE: Yunior Holden  [address] [address]     Dear Colleague,    Thank you for referring your patient, Yunior Holden, to the [facility]ERY [facility]. Please see a copy of my visit note below.    Ciara Holden is a 58 year old transgender female 6 weeks s/p minimal depth vaginoplasty  Urinating well  Had some wound fibrinous exudate that came out of her body  Swelling has improved.  Pain nearly completely resolved.  Has not tried masturbation.  Feels clitoris very sensitive.  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    /73 (BP Location: Left arm, Patient Position: Chair, Cuff Size: Adult Regular)   Pulse 63   Ht 1.854 m (6' 1\")   Wt 75.3 kg (166 lb)   SpO2 98%   BMI 21.90 kg/m      Exam:  Incisions cdi  One inch vagnial canal.  Urethra healthy  Labia minora well defined.    A/P   Excellent outcome after minimal depth vaginoplasty  Followup around 4-5 months postop    Troy Valverde MD      Again, thank you for allowing me to participate in the care of your patient.      Sincerely,    Troy Valverde MD    "

## 2024-08-13 NOTE — PROGRESS NOTES
"Ciara Holden is a 58 year old transgender female 6 weeks s/p minimal depth vaginoplasty  Urinating well  Had some wound fibrinous exudate that came out of her body  Swelling has improved.  Pain nearly completely resolved.  Has not tried masturbation.  Feels clitoris very sensitive.  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    /73 (BP Location: Left arm, Patient Position: Chair, Cuff Size: Adult Regular)   Pulse 63   Ht 1.854 m (6' 1\")   Wt 75.3 kg (166 lb)   SpO2 98%   BMI 21.90 kg/m      Exam:  Incisions cdi  One inch vagnial canal.  Urethra healthy  Labia minora well defined.    A/P   Excellent outcome after minimal depth vaginoplasty  Followup around 4-5 months postop    Troy Valverde MD  "

## 2024-08-13 NOTE — NURSING NOTE
"Chief Complaint   Patient presents with    CAPO [place], is being seen today for a 6 week postop MDV 6/28/24.       Vitals:    08/13/24 1317   BP: 131/73   BP Location: Left arm   Patient Position: Chair   Cuff Size: Adult Regular   Pulse: 63   SpO2: 98%   Weight: 75.3 kg (166 lb)   Height: 1.854 m (6' 1\")       Body mass index is 21.9 kg/m .      Shari Tian LPN    "

## 2025-01-14 ENCOUNTER — OFFICE VISIT (OUTPATIENT)
Dept: PLASTIC SURGERY | Facility: CLINIC | Age: 60
End: 2025-01-14
Payer: COMMERCIAL

## 2025-01-14 VITALS
SYSTOLIC BLOOD PRESSURE: 146 MMHG | DIASTOLIC BLOOD PRESSURE: 79 MMHG | WEIGHT: 159.6 LBS | HEIGHT: 73 IN | BODY MASS INDEX: 21.15 KG/M2 | OXYGEN SATURATION: 99 % | HEART RATE: 62 BPM

## 2025-01-14 DIAGNOSIS — F64.9 GENDER DYSPHORIA: Primary | ICD-10-CM

## 2025-01-14 ASSESSMENT — PAIN SCALES - GENERAL: PAINLEVEL_OUTOF10: NO PAIN (0)

## 2025-01-14 NOTE — LETTER
"2025       RE: Yunior Holden   [facility] [address]     Dear Colleague,    Thank you for referring your patient, Yunior Holden, to the [facility] REC[facility] SURGERY CLINIC [place] at [facility]. Please see a copy of my visit note below.      ASSESSMENT and PLAN  Yunior Holden is a 59 year old old adult who presents for followup after minimal depth vaginoplasty in 2024.    HPI  Doing well after surgery; swelling largely resolved. Does continue to note clitoral sensitivity; abrasive or irritable feeling per patient. This was an issue prior to vaginoplasty with mary genital sensitivity.     States very mild left sided fullness relative to right labia.     Urination is going well.    PHYSICAL EXAM  Patient is a 59 year old year old  adult   Vitals: Blood pressure (!) 146/79, pulse 62, height 1.854 m (6' 1\"), weight 72.4 kg (159 lb 9.6 oz), SpO2 99%.  Body mass index is 21.06 kg/m .  General Appearance Adult:   Alert, no acute distress, oriented  Lungs: no respiratory distress, or pursed lip breathing  Heart: No obvious jugular venous distension present  Abdomen: soft, nontender, no organomegaly or masses  Neuro: Alert, oriented, speech and mentation normal  Psych: affect and mood normal  : well healed vaginoplasty.  Some prominence of clitoris with moderate hooding.    Labia symmetrical and well healed  Clitoris and urethral plate healthy and viable      All pertinent imaging reviewed:  none      A/P:  S/p gender affirmation surgery for dysphoria      Sushil Campoverde MD   Urology    Physician Attestation  I, Troy Valverde MD, saw this patient and agree with the findings and plan of care as documented in the note.        Overall happy.  Has some ongoing clitoral hypersensitivity.  Clitoris could be hooded more, but I mostly discussed practices to desensitize clitoris and improve hygiene around clitoris to clitoral nieves " junction  Followup at 1 year postop with Barbara.    Troy Valverde MD  Reconstructive Urology      Again, thank you for allowing me to participate in the care of your patient.      Sincerely,    Troy Valverde MD

## 2025-01-14 NOTE — NURSING NOTE
"Chief Complaint   Patient presents with    RECHECK     7 month post-op, DOS 6/28/24.       Vitals:    01/14/25 1343   BP: (!) 146/79   BP Location: Left arm   Patient Position: Sitting   Cuff Size: Adult Regular   Pulse: 62   SpO2: 99%   Weight: 72.4 kg (159 lb 9.6 oz)   Height: 1.854 m (6' 1\")       Body mass index is 21.06 kg/m .                          Guerda Prince, EMT    "

## 2025-01-14 NOTE — PROGRESS NOTES
"  ASSESSMENT and PLAN  Yunior Holden is a 59 year old old adult who presents for followup after minimal depth vaginoplasty in 2024.    HPI  Doing well after surgery; swelling largely resolved. Does continue to note clitoral sensitivity; abrasive or irritable feeling per patient. This was an issue prior to vaginoplasty with mary genital sensitivity.     States very mild left sided fullness relative to right labia.     Urination is going well.    PHYSICAL EXAM  Patient is a 59 year old year old  adult   Vitals: Blood pressure (!) 146/79, pulse 62, height 1.854 m (6' 1\"), weight 72.4 kg (159 lb 9.6 oz), SpO2 99%.  Body mass index is 21.06 kg/m .  General Appearance Adult:   Alert, no acute distress, oriented  Lungs: no respiratory distress, or pursed lip breathing  Heart: No obvious jugular venous distension present  Abdomen: soft, nontender, no organomegaly or masses  Neuro: Alert, oriented, speech and mentation normal  Psych: affect and mood normal  : well healed vaginoplasty.  Some prominence of clitoris with moderate hooding.    Labia symmetrical and well healed  Clitoris and urethral plate healthy and viable      All pertinent imaging reviewed:  none      A/P:  S/p gender affirmation surgery for dysphoria      Sushil Campoverde MD   Urology    Physician Attestation   I, Troy Valverde MD, saw this patient and agree with the findings and plan of care as documented in the note.        Overall happy.  Has some ongoing clitoral hypersensitivity.  Clitoris could be hooded more, but I mostly discussed practices to desensitize clitoris and improve hygiene around clitoris to clitoral nieves junction  Followup at 1 year postop with Barbara.    Troy Valverde MD  Reconstructive Urology  "

## (undated) DEVICE — NDL COUNTER 20CT 31142493

## (undated) DEVICE — PLUG CATH AND DRAIN TUBE PROTECTOR DYND12200

## (undated) DEVICE — ESU LIGASURE DISSECTOR EXACT LF2019

## (undated) DEVICE — DRSG KERLIX 4 1/2"X4YDS ROLL 6715

## (undated) DEVICE — SPONGE LAP 18X18" X8435

## (undated) DEVICE — ESU PENCIL SMOKE EVAC W/ROCKER SWITCH 0703-047-000

## (undated) DEVICE — DRAPE IOBAN INCISE 23X17" 6650EZ

## (undated) DEVICE — BAG URINARY DRAIN 4000ML LF 153509

## (undated) DEVICE — CATH FOLYSIL 16FR 15ML AA6116

## (undated) DEVICE — SURGICEL POWDER ABSORBABLE HEMOSTAT 3GM 3013SP

## (undated) DEVICE — SU VICRYL 3-0 SH 27" UND J416H

## (undated) DEVICE — DRAPE LEGGINGS CLEAR 8430

## (undated) DEVICE — SU MONOCRYL 5-0 P-3 18" UND Y493G

## (undated) DEVICE — LABEL MEDICATION SYSTEM 3303-P

## (undated) DEVICE — SU PDS II 3-0 SH 27" Z316H

## (undated) DEVICE — SU SILK 0 SH 30" K834H

## (undated) DEVICE — BLADE KNIFE SURG 15 371115

## (undated) DEVICE — DRAPE POUCH IRR 1016

## (undated) DEVICE — SU PDS II 3-0 FS-1 27" CLR  Z442H

## (undated) DEVICE — Device

## (undated) DEVICE — DRSG XEROFORM 5X9" CUR253590W

## (undated) DEVICE — PEN MARKING W/RULER DYNJSM04

## (undated) DEVICE — DRAIN JACKSON PRATT RESERVOIR 100ML SU130-1305

## (undated) DEVICE — SUCTION MANIFOLD NEPTUNE 2 SYS 4 PORT 0702-020-000

## (undated) DEVICE — TUBING SUCTION MEDI-VAC SOFT 3/16"X20' N520A

## (undated) DEVICE — DRAIN JACKSON PRATT 10FR ROUND SU130-1321

## (undated) DEVICE — SU VICRYL+ 3-0 27IN SH UND VCP416H

## (undated) DEVICE — SU ETHILON 3-0 PS-1 18" 1663H

## (undated) DEVICE — ESU CORD BIPOLAR GREEN 10-4000

## (undated) DEVICE — SU MONOCRYL 3-0 PS-1 27" Y936H

## (undated) DEVICE — DRSG PRIMAPORE 02X3" 7133

## (undated) DEVICE — DRAPE U SPLIT 74X120" 29440

## (undated) DEVICE — DRAPE SHEET REV FOLD 3/4 9349

## (undated) DEVICE — SU PROLENE 0 CT-1 30" 8424H

## (undated) DEVICE — BLADE CLIPPER SGL USE 9680

## (undated) DEVICE — DRAPE POUCH INSTRUMENT 1018

## (undated) RX ORDER — BUPIVACAINE HYDROCHLORIDE AND EPINEPHRINE 2.5; 5 MG/ML; UG/ML
INJECTION, SOLUTION EPIDURAL; INFILTRATION; INTRACAUDAL; PERINEURAL
Status: DISPENSED
Start: 2024-06-28

## (undated) RX ORDER — FENTANYL CITRATE 50 UG/ML
INJECTION, SOLUTION INTRAMUSCULAR; INTRAVENOUS
Status: DISPENSED
Start: 2024-06-28

## (undated) RX ORDER — HEPARIN SODIUM 5000 [USP'U]/.5ML
INJECTION, SOLUTION INTRAVENOUS; SUBCUTANEOUS
Status: DISPENSED
Start: 2024-06-28

## (undated) RX ORDER — HYDROMORPHONE HYDROCHLORIDE 1 MG/ML
INJECTION, SOLUTION INTRAMUSCULAR; INTRAVENOUS; SUBCUTANEOUS
Status: DISPENSED
Start: 2024-06-28

## (undated) RX ORDER — ONDANSETRON 2 MG/ML
INJECTION INTRAMUSCULAR; INTRAVENOUS
Status: DISPENSED
Start: 2024-06-28

## (undated) RX ORDER — PROPOFOL 10 MG/ML
INJECTION, EMULSION INTRAVENOUS
Status: DISPENSED
Start: 2024-06-28

## (undated) RX ORDER — PHENYLEPHRINE HCL IN 0.9% NACL 50MG/250ML
PLASTIC BAG, INJECTION (ML) INTRAVENOUS
Status: DISPENSED
Start: 2024-06-28

## (undated) RX ORDER — DEXAMETHASONE SODIUM PHOSPHATE 4 MG/ML
INJECTION, SOLUTION INTRA-ARTICULAR; INTRALESIONAL; INTRAMUSCULAR; INTRAVENOUS; SOFT TISSUE
Status: DISPENSED
Start: 2024-06-28

## (undated) RX ORDER — GLYCOPYRROLATE 0.2 MG/ML
INJECTION, SOLUTION INTRAMUSCULAR; INTRAVENOUS
Status: DISPENSED
Start: 2024-06-28

## (undated) RX ORDER — HYDROMORPHONE HCL IN WATER/PF 6 MG/30 ML
PATIENT CONTROLLED ANALGESIA SYRINGE INTRAVENOUS
Status: DISPENSED
Start: 2024-06-28

## (undated) RX ORDER — SCOLOPAMINE TRANSDERMAL SYSTEM 1 MG/1
PATCH, EXTENDED RELEASE TRANSDERMAL
Status: DISPENSED
Start: 2024-06-28

## (undated) RX ORDER — FENTANYL CITRATE-0.9 % NACL/PF 10 MCG/ML
PLASTIC BAG, INJECTION (ML) INTRAVENOUS
Status: DISPENSED
Start: 2024-06-28

## (undated) RX ORDER — CEFAZOLIN SODIUM/WATER 2 G/20 ML
SYRINGE (ML) INTRAVENOUS
Status: DISPENSED
Start: 2024-06-28